# Patient Record
Sex: FEMALE
[De-identification: names, ages, dates, MRNs, and addresses within clinical notes are randomized per-mention and may not be internally consistent; named-entity substitution may affect disease eponyms.]

---

## 2017-11-12 ENCOUNTER — HOSPITAL ENCOUNTER (INPATIENT)
Dept: HOSPITAL 92 - ERS | Age: 27
LOS: 1 days | Discharge: HOME | DRG: 639 | End: 2017-11-13
Attending: FAMILY MEDICINE | Admitting: FAMILY MEDICINE
Payer: COMMERCIAL

## 2017-11-12 VITALS — BODY MASS INDEX: 30.6 KG/M2

## 2017-11-12 DIAGNOSIS — E10.10: Primary | ICD-10-CM

## 2017-11-12 DIAGNOSIS — Z79.4: ICD-10-CM

## 2017-11-12 DIAGNOSIS — F41.9: ICD-10-CM

## 2017-11-12 DIAGNOSIS — F32.9: ICD-10-CM

## 2017-11-12 LAB
ALP SERPL-CCNC: 123 U/L (ref 40–150)
ALT SERPL W P-5'-P-CCNC: 20 U/L (ref 8–55)
ANION GAP SERPL CALC-SCNC: 15 MMOL/L (ref 10–20)
ANION GAP SERPL CALC-SCNC: 15 MMOL/L (ref 10–20)
ANION GAP SERPL CALC-SCNC: 16 MMOL/L (ref 10–20)
ANION GAP SERPL CALC-SCNC: 26 MMOL/L (ref 10–20)
AST SERPL-CCNC: 17 U/L (ref 5–34)
BASOPHILS # BLD AUTO: 0 THOU/UL (ref 0–0.2)
BASOPHILS NFR BLD AUTO: 0.1 % (ref 0–1)
BILIRUB SERPL-MCNC: 0.4 MG/DL (ref 0.2–1.2)
BUN SERPL-MCNC: 12 MG/DL (ref 7–18.7)
BUN SERPL-MCNC: 15 MG/DL (ref 7–18.7)
BUN SERPL-MCNC: 7 MG/DL (ref 7–18.7)
BUN SERPL-MCNC: 8 MG/DL (ref 7–18.7)
CALCIUM SERPL-MCNC: 7.8 MG/DL (ref 7.8–10.44)
CALCIUM SERPL-MCNC: 8.5 MG/DL (ref 7.8–10.44)
CALCIUM SERPL-MCNC: 8.7 MG/DL (ref 7.8–10.44)
CALCIUM SERPL-MCNC: 9.7 MG/DL (ref 7.8–10.44)
CHLORIDE SERPL-SCNC: 104 MMOL/L (ref 98–107)
CHLORIDE SERPL-SCNC: 107 MMOL/L (ref 98–107)
CHLORIDE SERPL-SCNC: 112 MMOL/L (ref 98–107)
CHLORIDE SERPL-SCNC: 114 MMOL/L (ref 98–107)
CO2 SERPL-SCNC: 11 MMOL/L (ref 22–29)
CO2 SERPL-SCNC: 16 MMOL/L (ref 22–29)
CO2 SERPL-SCNC: 18 MMOL/L (ref 22–29)
CO2 SERPL-SCNC: 19 MMOL/L (ref 22–29)
CREAT CL PREDICTED SERPL C-G-VRATE: 0 ML/MIN (ref 70–130)
CREAT CL PREDICTED SERPL C-G-VRATE: 0 ML/MIN (ref 70–130)
CREAT CL PREDICTED SERPL C-G-VRATE: 124 ML/MIN (ref 70–130)
CREAT CL PREDICTED SERPL C-G-VRATE: 150 ML/MIN (ref 70–130)
EOSINOPHIL # BLD AUTO: 0.1 THOU/UL (ref 0–0.7)
EOSINOPHIL NFR BLD AUTO: 0.6 % (ref 0–10)
GLOBULIN SER CALC-MCNC: 3.5 G/DL (ref 2.4–3.5)
GLUCOSE UR STRIP-MCNC: 500 MG/DL
HCT VFR BLD CALC: 44.2 % (ref 36–47)
LYMPHOCYTES # BLD: 1.5 THOU/UL (ref 1.2–3.4)
LYMPHOCYTES NFR BLD AUTO: 13.8 % (ref 21–51)
MONOCYTES # BLD AUTO: 0.4 THOU/UL (ref 0.11–0.59)
MONOCYTES NFR BLD AUTO: 3.7 % (ref 0–10)
NEUTROPHILS # BLD AUTO: 8.8 THOU/UL (ref 1.4–6.5)
RBC # BLD AUTO: 4.46 MILL/UL (ref 4.2–5.4)
WBC # BLD AUTO: 10.7 THOU/UL (ref 4.8–10.8)

## 2017-11-12 PROCEDURE — 93005 ELECTROCARDIOGRAM TRACING: CPT

## 2017-11-12 PROCEDURE — 81003 URINALYSIS AUTO W/O SCOPE: CPT

## 2017-11-12 PROCEDURE — 80053 COMPREHEN METABOLIC PANEL: CPT

## 2017-11-12 PROCEDURE — 80048 BASIC METABOLIC PNL TOTAL CA: CPT

## 2017-11-12 PROCEDURE — 82010 KETONE BODYS QUAN: CPT

## 2017-11-12 PROCEDURE — 96361 HYDRATE IV INFUSION ADD-ON: CPT

## 2017-11-12 PROCEDURE — 96375 TX/PRO/DX INJ NEW DRUG ADDON: CPT

## 2017-11-12 PROCEDURE — 85025 COMPLETE CBC W/AUTO DIFF WBC: CPT

## 2017-11-12 PROCEDURE — 96374 THER/PROPH/DIAG INJ IV PUSH: CPT

## 2017-11-12 PROCEDURE — 36416 COLLJ CAPILLARY BLOOD SPEC: CPT

## 2017-11-12 PROCEDURE — 36415 COLL VENOUS BLD VENIPUNCTURE: CPT

## 2017-11-12 RX ADMIN — POTASSIUM CHLORIDE AND SODIUM CHLORIDE SCH: 900; 150 INJECTION, SOLUTION INTRAVENOUS at 15:28

## 2017-11-12 RX ADMIN — POTASSIUM CHLORIDE AND SODIUM CHLORIDE SCH MLS: 900; 150 INJECTION, SOLUTION INTRAVENOUS at 13:50

## 2017-11-12 NOTE — HP
PRIMARY CARE PHYSICIAN:  [facility].

 

CHIEF COMPLAINT:  Nausea, vomiting, and hyperglycemia.

 

PRIMARY CARE ENDOCRINOLOGIST:  Dr. Alexander at [facility].

 

HISTORY OF PRESENT ILLNESS:  This is a 27-year-old  female with past medical history of diab
etes mellitus type 1, insulin-dependent, with multiple admissions for DKA, though less frequently ov
er the last couple of years.  She reports that she has been taking her insulin as prescribed, 22 uni
ts of Lantus per day and NovoLog 8 units before each meal.  She reports that yesterday after eating 
some enchilada's, she started having abdominal pain, nausea, and vomiting.  She vomited 3-4 times ye
sterday, food and mucus, no bile or blood and this morning, she reports that her blood sugar was 500
 and she had 1 more episode of vomiting.  The patient presented to the emergency room and was found 
to have a blood sugar of 415 in the emergency room and was also tachycardic.  The patient had 2 lite
rs of normal saline bolused and then normal saline running at 500 mL per hour after that.  She also 
had 7.5 units of Novolin R IV push and Zofran.  The patient's blood sugars now down to the low 100s 
and she is feeling much better.  No further abdominal pain, nausea or vomiting in the emergency room
.

 

PAST MEDICAL HISTORY:

1.  Diabetes mellitus type 1, insulin-dependent.

2.  Depression and anxiety.

 

PAST SURGICAL HISTORY:  None.

 

ALLERGIES:  No known drug allergies.

 

MEDICATIONS:  Lantus 22 units daily, and NovoLog 8 units before each meal.

 

SOCIAL HISTORY:  Patient denies tobacco, alcohol, or illicit drug use.

 

FAMILY HISTORY:  Diabetes, heart disease and hypertension.

 

REVIEW OF SYSTEMS:  Constitutional:  No fevers, no chills, no weight changes.  Eyes:  No double visi
on or blurred vision.  ENT:  No congestion, drainage or sore throat.  Cardiovascular:  No chest pain
, palpitations or racing heart.  Pulmonary:  No coughing, wheezing or shortness of breath.  Gastroin
testinal:  No diarrhea or constipation.  See HPI for positives.  Genitourinary:  No dysuria or hemat
uria.  She has not noticed any change in her urine output recently.  Musculoskeletal:  No muscle ach
es or joint pains.  Skin:  No rashes or other lesions noted.  Neurologic:  No numbness, tingling or 
focal weakness.

 

PHYSICAL EXAMINATION:

VITAL SIGNS:  Blood pressure 102/66, pulse down to 101 after the fluids from 122 on presentation, re
spirations 15, temperature 98.5, O2 sat 98% on room air.

GENERAL:  This is a well-developed, well-nourished  female, in no apparent distress.

HEENT:  Pupils equal, round, and react to light.  Oropharynx clear without lesions, erythema or exud
ate.  She does have moist mucous membranes now.

HEART:  Regular rate and rhythm, no murmurs, rubs or gallops.

LUNGS:  Clear to auscultation bilaterally, no wheezes, crackles or rhonchi.

NECK:  Soft, nontender to palpation, normoactive bowel sounds, no hepatosplenomegaly or other masses
.

EXTREMITIES:  No clubbing, cyanosis or edema.

SKIN:  No rashes or other lesions noted.

NEUROLOGIC:  Cranial nerves intact and equal bilaterally.  No facial droop.  Deep tendon reflexes 2+
 in all extremities and strength intact in all extremities.

PSYCHIATRIC:  Patient is alert and oriented x3.  She does have a little bit of a flattened affect.

 

LABORATORY DATA:  CBC within normal limits.  Complete metabolic panel was notable only for carbon di
oxide of 11, anion gap of 26.  Her glucose was 398 on a central venous check and now down to 138 per
ipherally.  Urinalysis had glucose and ketones, but no evidence of infection and her beta hydroxybut
yrate was 6.12 in the blood.

 

ASSESSMENT AND PLAN:

1.  Diabetic ketoacidosis.  The patient's hyperglycemia has resolved with initial insulin bolus, we 
will hold on insulin drip for now.  The patient does state that she has been taking her insulin, so 
she may have her long-acting insulin still on board, thus this would be unusual for her to get as ac
idotic with some insulin.  We will check fingerstick blood sugars every hour and start insulin drip 
if she rebounds.  Otherwise, we will give her NovoLog with meals as normal and restart her Lantus wh
en she is eating.  Patient has gotten a good fluid resuscitation in the emergency room, she may need
 a little bit more, but we will recheck her basic metabolic panel and keep giving fluids until her a
nion gap acidosis resolves.

2.  We will replace potassium as this is likely to drop with only being 3.8 initially to prevent hyp
okalemia.

3.  Gastrointestinal prophylaxis.  Put the patient on Pepcid while she remains in the hospital.

4.  Deep venous thrombosis prophylaxis.  Put the patient on sequential compression devices and TEDs 
while in bed.

5.  CODE STATUS:  Patient is FULL CODE.  Should she be incapacitated, she states that her medical de
cision maker will be her mother, Shelly Gonzalez.

## 2017-11-13 VITALS — TEMPERATURE: 98.6 F | SYSTOLIC BLOOD PRESSURE: 122 MMHG | DIASTOLIC BLOOD PRESSURE: 74 MMHG

## 2017-11-13 LAB
ANION GAP SERPL CALC-SCNC: 14 MMOL/L (ref 10–20)
BASOPHILS # BLD AUTO: 0.1 THOU/UL (ref 0–0.2)
BASOPHILS NFR BLD AUTO: 0.9 % (ref 0–1)
BUN SERPL-MCNC: 7 MG/DL (ref 7–18.7)
CALCIUM SERPL-MCNC: 8.5 MG/DL (ref 7.8–10.44)
CHLORIDE SERPL-SCNC: 106 MMOL/L (ref 98–107)
CO2 SERPL-SCNC: 19 MMOL/L (ref 22–29)
CREAT CL PREDICTED SERPL C-G-VRATE: 143 ML/MIN (ref 70–130)
EOSINOPHIL # BLD AUTO: 0.2 THOU/UL (ref 0–0.7)
EOSINOPHIL NFR BLD AUTO: 1.9 % (ref 0–10)
HCT VFR BLD CALC: 38.4 % (ref 36–47)
LYMPHOCYTES # BLD: 2.7 THOU/UL (ref 1.2–3.4)
LYMPHOCYTES NFR BLD AUTO: 32.6 % (ref 21–51)
MONOCYTES # BLD AUTO: 0.6 THOU/UL (ref 0.11–0.59)
MONOCYTES NFR BLD AUTO: 7.2 % (ref 0–10)
NEUTROPHILS # BLD AUTO: 4.8 THOU/UL (ref 1.4–6.5)
RBC # BLD AUTO: 3.89 MILL/UL (ref 4.2–5.4)
WBC # BLD AUTO: 8.4 THOU/UL (ref 4.8–10.8)

## 2017-11-13 RX ADMIN — INSULIN LISPRO PRN UNIT: 100 INJECTION, SOLUTION INTRAVENOUS; SUBCUTANEOUS at 05:44

## 2017-11-13 RX ADMIN — INSULIN LISPRO PRN UNIT: 100 INJECTION, SOLUTION INTRAVENOUS; SUBCUTANEOUS at 11:55

## 2017-11-13 NOTE — PDOC.PN
- Subjective


Encounter Start Date: 11/13/17


Encounter Start Time: 09:40


Subjective: Patient without complaint this AM. No more N/V. No abd pain. BS 

normal. 


-: Eating well. Ready to go home. Has insulin at home.





- Objective


Resuscitation Status: 


 











Resuscitation Status           FULL:Full Resuscitation














MAR Reviewed: Yes


Vital Signs & Weight: 


 Vital Signs (12 hours)











  Temp Pulse Resp BP Pulse Ox


 


 11/13/17 08:00  98.8 F  78  16   98


 


 11/13/17 07:06  98.8 F  78  16  97/63  98


 


 11/13/17 04:00  98.6 F  79  18  110/69  97


 


 11/13/17 00:00  98.4 F  76  16  98/58 L  96








 Weight











Weight                         163 lb 2 oz














I&O: 


 











 11/12/17 11/13/17 11/14/17





 06:59 06:59 06:59


 


Intake Total  1320 


 


Output Total  1300 


 


Balance  20 











Result Diagrams: 


 11/13/17 03:58





 11/13/17 03:58


Additional Labs: 


 Accuchecks











  11/13/17 11/12/17 11/12/17





  05:41 20:28 16:10


 


POC Glucose  170 H  266 H  104














  11/12/17 11/12/17 11/12/17





  15:05 13:50 12:23


 


POC Glucose  108  99  94














  11/12/17





  11:12


 


POC Glucose  138 H














Phys Exam





- Physical Examination


Constitutional: NAD


HEENT: moist MMs


Respiratory: no wheezing, no rales, no rhonchi, clear to auscultation bilateral


Cardiovascular: RRR, no significant murmur


Gastrointestinal: soft, non-tender, no distention, positive bowel sounds


Neurological: non-focal, moves all 4 limbs


Psychiatric: A&O x 3


Deviation from normal: mildly flat affect





Dx/Plan


(1) Diabetic keto-acidosis


Code(s): E13.10 - OTH DIABETES MELLITUS WITH KETOACIDOSIS WITHOUT COMA   Status

: Resolved   Comment: Blood sugars normalized this AM. On home insulin. Gap 

resolved. Eating well.   





(2) Diabetes mellitus type 1


Status: Chronic   





- Plan


cont current plan of care


Home today





* .








- Discharge Day


Encounter end time: 09:55

## 2017-11-13 NOTE — DIS
PRIMARY CARE PHYSICIAN:  Caron Rose.

 

PRIMARY ENDOCRINOLOGIST:  Dr. Alexander.

 

DIAGNOSES ON ADMISSION:

1.  Diabetic ketoacidosis.

2.  Diabetes mellitus type 1, insulin-dependent.

 

DIAGNOSES ON DISCHARGE:

1.  Diabetic ketoacidosis, resolved.

2.  Diabetes mellitus type 1, insulin-dependent.

 

CONSULTATIONS:  None.

 

PROCEDURES:  None.

 

SUMMARY OF HOSPITAL COURSE:  This is a 27-year-old  female with a history of diabetes mellit
 type 1, admitted multiple times for DKA several years ago, but has not had a lot of episodes rece
ntly.  She ate some enchiladas the day before admission, started having abdominal pain, nausea, and 
vomiting.  The day of admission, she had nausea and vomiting one more time and noted to have blood s
ugars in the 500s, came into the emergency room.  Her blood sugars were running in the 400s in the e
mergency room, she was tachycardic and acidotic.  She was given multiple liters of fluid in the [facility]
gency room along with some IV push and insulin which returned her blood sugar immediately to normal.
  The patient was put in the hospital overnight.  She had IV fluids and potassium.  She was able to 
eat and was transitioned to her home insulin with normal blood sugars this morning, feeling well, no
 abdominal pain or tenderness on exam and no more nausea or vomiting.  She is being discharged home.

 

DISCHARGE MANAGEMENT:  Discharged home.  Follow up with primary care doctor in 1 week.

 

ACTIVITY:  As tolerated.

 

DIET:  Diabetic diet.

 

DISCHARGE MEDICATIONS:  Resume home medications.

1.  NovoLog FlexPen 8 units 3 times a day with meals.

2.  Lantus 22 units subcu daily.

3.  Risperidone 1 mg at night as needed.

4.  Wellbutrin- mg every morning.

5.  Ambien 5 mg at night as needed.

6.  Prozac 20 mg daily.

## 2018-02-08 ENCOUNTER — HOSPITAL ENCOUNTER (INPATIENT)
Dept: HOSPITAL 92 - ERS | Age: 28
LOS: 3 days | Discharge: HOME | DRG: 638 | End: 2018-02-11
Attending: INTERNAL MEDICINE | Admitting: INTERNAL MEDICINE
Payer: COMMERCIAL

## 2018-02-08 VITALS — BODY MASS INDEX: 26.1 KG/M2

## 2018-02-08 DIAGNOSIS — Z79.4: ICD-10-CM

## 2018-02-08 DIAGNOSIS — E10.10: Primary | ICD-10-CM

## 2018-02-08 DIAGNOSIS — F41.8: ICD-10-CM

## 2018-02-08 DIAGNOSIS — N17.9: ICD-10-CM

## 2018-02-08 DIAGNOSIS — N39.0: ICD-10-CM

## 2018-02-08 DIAGNOSIS — E87.6: ICD-10-CM

## 2018-02-08 DIAGNOSIS — E86.0: ICD-10-CM

## 2018-02-08 LAB
ALBUMIN SERPL BCG-MCNC: 4.8 G/DL (ref 3.5–5)
ALP SERPL-CCNC: 123 U/L (ref 40–150)
ALT SERPL W P-5'-P-CCNC: 16 U/L (ref 8–55)
ANION GAP SERPL CALC-SCNC: (no result) MMOL/L (ref 10–20)
ANION GAP SERPL CALC-SCNC: 24 MMOL/L (ref 10–20)
ANION GAP SERPL CALC-SCNC: 24 MMOL/L (ref 10–20)
AST SERPL-CCNC: 15 U/L (ref 5–34)
BACTERIA UR QL AUTO: (no result) HPF
BASOPHILS # BLD AUTO: 0.1 THOU/UL (ref 0–0.2)
BASOPHILS NFR BLD AUTO: 0.3 % (ref 0–1)
BILIRUB SERPL-MCNC: 0.6 MG/DL (ref 0.2–1.2)
BUN SERPL-MCNC: 10 MG/DL (ref 7–18.7)
BUN SERPL-MCNC: 11 MG/DL (ref 7–18.7)
BUN SERPL-MCNC: 11 MG/DL (ref 7–18.7)
CALCIUM SERPL-MCNC: 9.1 MG/DL (ref 7.8–10.44)
CALCIUM SERPL-MCNC: 9.2 MG/DL (ref 7.8–10.44)
CALCIUM SERPL-MCNC: 9.5 MG/DL (ref 7.8–10.44)
CHLORIDE SERPL-SCNC: 109 MMOL/L (ref 98–107)
CK MB SERPL-MCNC: 0.4 NG/ML (ref 0–6.6)
CO2 SERPL-SCNC: (no result) MMOL/L (ref 22–29)
CO2 SERPL-SCNC: 8 MMOL/L (ref 22–29)
CO2 SERPL-SCNC: 8 MMOL/L (ref 22–29)
CREAT CL PREDICTED SERPL C-G-VRATE: 0 ML/MIN (ref 70–130)
CREAT CL PREDICTED SERPL C-G-VRATE: 0 ML/MIN (ref 70–130)
CREAT CL PREDICTED SERPL C-G-VRATE: 79 ML/MIN (ref 70–130)
CRYSTAL-AUWI FLAG: 0 (ref 0–15)
EOSINOPHIL # BLD AUTO: 0.1 THOU/UL (ref 0–0.7)
EOSINOPHIL NFR BLD AUTO: 0.4 % (ref 0–10)
GLOBULIN SER CALC-MCNC: 3.7 G/DL (ref 2.4–3.5)
GLUCOSE SERPL-MCNC: 221 MG/DL (ref 70–105)
GLUCOSE SERPL-MCNC: 252 MG/DL (ref 70–105)
GLUCOSE SERPL-MCNC: 292 MG/DL (ref 70–105)
GLUCOSE UR STRIP-MCNC: >=1000 MG/DL
HEV IGM SER QL: 4 (ref 0–7.99)
HGB BLD-MCNC: 15.4 G/DL (ref 12–16)
HYALINE CASTS #/AREA URNS LPF: (no result) LPF
LYMPHOCYTES # BLD: 1.1 THOU/UL (ref 1.2–3.4)
LYMPHOCYTES NFR BLD AUTO: 6.4 % (ref 21–51)
MAGNESIUM SERPL-MCNC: 1.9 MG/DL (ref 1.6–2.6)
MCH RBC QN AUTO: 33.3 PG (ref 27–31)
MCV RBC AUTO: 97.8 FL (ref 81–99)
MONOCYTES # BLD AUTO: 1 THOU/UL (ref 0.11–0.59)
MONOCYTES NFR BLD AUTO: 5.7 % (ref 0–10)
NEUTROPHILS # BLD AUTO: 15.5 THOU/UL (ref 1.4–6.5)
NEUTROPHILS NFR BLD AUTO: 87.1 % (ref 42–75)
PATHC CAST-AUWI FLAG: 0.27 (ref 0–2.49)
PLATELET # BLD AUTO: 313 THOU/UL (ref 130–400)
POTASSIUM SERPL-SCNC: 3.2 MMOL/L (ref 3.5–5.1)
POTASSIUM SERPL-SCNC: 3.3 MMOL/L (ref 3.5–5.1)
POTASSIUM SERPL-SCNC: 3.6 MMOL/L (ref 3.5–5.1)
PREGU CONTROL BACKGROUND?: (no result)
PREGU CONTROL BAR APPEAR?: YES
PROT UR STRIP.AUTO-MCNC: 30 MG/DL
RBC # BLD AUTO: 4.61 MILL/UL (ref 4.2–5.4)
RBC UR QL AUTO: (no result) HPF (ref 0–3)
SODIUM SERPL-SCNC: 137 MMOL/L (ref 136–145)
SODIUM SERPL-SCNC: 137 MMOL/L (ref 136–145)
SODIUM SERPL-SCNC: 138 MMOL/L (ref 136–145)
SP GR UR STRIP: 1.03 (ref 1–1.04)
SPERM-AUWI FLAG: 0 (ref 0–9.9)
TROPONIN I SERPL DL<=0.01 NG/ML-MCNC: 0.01 NG/ML (ref ?–0.03)
WBC # BLD AUTO: 17.8 THOU/UL (ref 4.8–10.8)
YEAST-AUWI FLAG: 0 (ref 0–25)

## 2018-02-08 PROCEDURE — A4216 STERILE WATER/SALINE, 10 ML: HCPCS

## 2018-02-08 PROCEDURE — 96366 THER/PROPH/DIAG IV INF ADDON: CPT

## 2018-02-08 PROCEDURE — 36415 COLL VENOUS BLD VENIPUNCTURE: CPT

## 2018-02-08 PROCEDURE — 85025 COMPLETE CBC W/AUTO DIFF WBC: CPT

## 2018-02-08 PROCEDURE — 84100 ASSAY OF PHOSPHORUS: CPT

## 2018-02-08 PROCEDURE — 83036 HEMOGLOBIN GLYCOSYLATED A1C: CPT

## 2018-02-08 PROCEDURE — 96375 TX/PRO/DX INJ NEW DRUG ADDON: CPT

## 2018-02-08 PROCEDURE — 81015 MICROSCOPIC EXAM OF URINE: CPT

## 2018-02-08 PROCEDURE — 84484 ASSAY OF TROPONIN QUANT: CPT

## 2018-02-08 PROCEDURE — 80048 BASIC METABOLIC PNL TOTAL CA: CPT

## 2018-02-08 PROCEDURE — 96365 THER/PROPH/DIAG IV INF INIT: CPT

## 2018-02-08 PROCEDURE — 80053 COMPREHEN METABOLIC PANEL: CPT

## 2018-02-08 PROCEDURE — 80061 LIPID PANEL: CPT

## 2018-02-08 PROCEDURE — 82010 KETONE BODYS QUAN: CPT

## 2018-02-08 PROCEDURE — 81003 URINALYSIS AUTO W/O SCOPE: CPT

## 2018-02-08 PROCEDURE — 93005 ELECTROCARDIOGRAM TRACING: CPT

## 2018-02-08 PROCEDURE — 36416 COLLJ CAPILLARY BLOOD SPEC: CPT

## 2018-02-08 PROCEDURE — 81025 URINE PREGNANCY TEST: CPT

## 2018-02-08 PROCEDURE — 82553 CREATINE MB FRACTION: CPT

## 2018-02-08 PROCEDURE — 71046 X-RAY EXAM CHEST 2 VIEWS: CPT

## 2018-02-08 PROCEDURE — 83735 ASSAY OF MAGNESIUM: CPT

## 2018-02-08 RX ADMIN — POTASSIUM CHLORIDE, DEXTROSE MONOHYDRATE AND SODIUM CHLORIDE PRN MLS: 150; 5; 450 INJECTION, SOLUTION INTRAVENOUS at 21:27

## 2018-02-08 NOTE — HP
PRIMARY CARE PHYSICIAN:  Gregorio Bryan M.D.

 

CHIEF COMPLAINT:  Vomiting and abdominal pain.

 

HISTORY OF PRESENT ILLNESS:  Ms. Gonzalez is a pleasant 27-year-old lady who was seen at [facility] on 02/08/2018.  She has a history of diabetes mellitus type 1 and takes insulin
 at home.  She reports that she is compliant with her insulin.  She reports that she was doing well u
ntil yesterday.  Today morning, she developed nausea and vomiting.  She also reports pain in the uppe
r abdomen, dull, nonradiating, patient is unable to rate the pain, and she is unable to recall any as
sociated symptoms other than nausea and vomiting.  She denies fevers or chills.  She denies chest rula
n and shortness of breath.

 

She also denies dysuria.

 

PAST MEDICAL HISTORY:  Significant for diabetes mellitus type 1, depression and anxiety.

 

PAST SURGICAL HISTORY:  None.

 

ALLERGIES:  No known drug allergies.

 

MEDICATIONS:  The patient is unsure regarding some of her medications, but appears to be taking Lantu
s insulin 22 units daily and NovoLog insulin 8 units before each meal.

 

SOCIAL HISTORY:  The patient denies tobacco use, alcohol use or recreational drug use.

 

FAMILY HISTORY:  Significant for diabetes mellitus in her paternal grandparents.

 

PHYSICAL EXAMINATION:

GENERAL:  On examination, Ms. Gonzalez is awake and alert, not in acute distress.

VITAL SIGNS:  Temperature is 99.7 degrees Fahrenheit, blood pressure is 136/90, pulse is 161.  She is
 breathing at rate of 18 and saturating 99% on room air.

EYES:  No scleral icterus.  No conjunctival pallor.

ENT:  Dry mucosal membranes, no oropharyngeal erythema or exudates.

NECK:  Supple, nontender, normal range of movement.  Trachea is midline.

RESPIRATORY:  Accessory muscles of breathing are not active.  Chest wall movements are symmetric bila
terally.

LUNGS:  Clear to auscultation without wheeze, rhonchi or crepitations.

CARDIOVASCULAR:  S1 and S2 are heard, tachycardic and regular.  Peripheral pulses palpable.  No carot
id bruit, no pericardial rub.

ABDOMEN:  Soft, nontender, bowel sounds heard, no hepatomegaly, no splenomegaly.

NEUROLOGIC:  Cranial nerves II-XII are intact.  Deep tendon reflexes are 2+.

MUSCULOSKELETAL:  Power is 5/5 in all 4 extremities.

SKIN:  No rashes or subcutaneous nodules.

LYMPHATIC:  No cervical lymphadenopathy.

PSYCHIATRIC:  Normal mood, normal affect, patient is oriented to person, place, and time.

 

IMAGING DATA AND LABORATORY DATA:  Ms. Gonzalez's labs and investigations were reviewed.  She had an 
electrocardiogram done, which shows sinus tachycardia.  She has leukocytosis with 17,800 white cells,
 of which 87% are neutrophils, normal hemoglobin, normal platelet count, normal sodium of 137, decrea
sed potassium of 3.3, elevated chloride of 109, carbon dioxide less than 8, creatinine elevated at 1.
35, unremarkable liver profile, elevated beta hydroxybutyrate of 5.99.  Urinalysis positive for prote
in, glucose, ketones, and trace leukocyte esterase.  Urine pregnancy test is negative.

 

ASSESSMENT AND PLAN:  Ms. Gonzalez is a pleasant 27-year-old lady who was seen at [facility] on 02/08/2018.  Her problem list includes:

1.  Diabetic ketoacidosis:  She is presenting with diabetic ketoacidosis.  She will be admitted to [facility] [place] and treated with intravenous fluids, electrolyte replacement and intravenous insulin.  Jes
ology unclear at this time.  She reports that she is compliant with her insulin.  It is possible that
 this is triggered by urinary tract infection.  We will also get chest x-ray to rule out any pulmonar
y infection.  The patient has been started on ceftriaxone, which I will continue.

2.  Urinary tract infection:  Suspected, although urinalysis is positive only for small amount of fior
kocyte esterase.  We will continue her on ceftriaxone.

3.  Acute kidney injury:  Likely secondary to diabetic ketoacidosis.  Recheck creatinine.

4.  Hypokalemia:  Her electrolytes will be checked and replaced per DKA protocol.

5.  Anxiety and depression:  Her home medications can be resumed once clarified.

 

Many thanks for allowing me to participate in your patient's care.  Please feel free to contact me wi
th any questions or concerns.

 

LEVEL OF RISK:  Moderate.

 

LEVEL OF COMPLEXITY:  Moderate.

## 2018-02-08 NOTE — RAD
TWO VIEW CHEST:

2/8/18

 

HISTORY: 

Cough. Assess for lung infiltrates. 

 

COMPARISON:  

7/3/13

 

The lung fields are clear. Heart and mediastinum are unremarkable. 

 

IMPRESSION:  

No acute finding.

 

POS: [facility]

## 2018-02-09 LAB
ANION GAP SERPL CALC-SCNC: 13 MMOL/L (ref 10–20)
ANION GAP SERPL CALC-SCNC: 14 MMOL/L (ref 10–20)
BASOPHILS # BLD AUTO: 0.1 THOU/UL (ref 0–0.2)
BASOPHILS NFR BLD AUTO: 0.7 % (ref 0–1)
BUN SERPL-MCNC: 8 MG/DL (ref 7–18.7)
BUN SERPL-MCNC: 9 MG/DL (ref 7–18.7)
CALCIUM SERPL-MCNC: 8.6 MG/DL (ref 7.8–10.44)
CALCIUM SERPL-MCNC: 8.7 MG/DL (ref 7.8–10.44)
CHLORIDE SERPL-SCNC: 115 MMOL/L (ref 98–107)
CHLORIDE SERPL-SCNC: 115 MMOL/L (ref 98–107)
CO2 SERPL-SCNC: 13 MMOL/L (ref 22–29)
CO2 SERPL-SCNC: 14 MMOL/L (ref 22–29)
CREAT CL PREDICTED SERPL C-G-VRATE: 102 ML/MIN (ref 70–130)
CREAT CL PREDICTED SERPL C-G-VRATE: 90 ML/MIN (ref 70–130)
EOSINOPHIL # BLD AUTO: 0.1 THOU/UL (ref 0–0.7)
EOSINOPHIL NFR BLD AUTO: 0.9 % (ref 0–10)
GLUCOSE SERPL-MCNC: 153 MG/DL (ref 70–105)
GLUCOSE SERPL-MCNC: 155 MG/DL (ref 70–105)
HGB BLD-MCNC: 12.5 G/DL (ref 12–16)
LYMPHOCYTES # BLD: 3 THOU/UL (ref 1.2–3.4)
LYMPHOCYTES NFR BLD AUTO: 23.8 % (ref 21–51)
MCH RBC QN AUTO: 33.5 PG (ref 27–31)
MCV RBC AUTO: 97.9 FL (ref 81–99)
MONOCYTES # BLD AUTO: 1.3 THOU/UL (ref 0.11–0.59)
MONOCYTES NFR BLD AUTO: 10.5 % (ref 0–10)
NEUTROPHILS # BLD AUTO: 8.2 THOU/UL (ref 1.4–6.5)
NEUTROPHILS NFR BLD AUTO: 64.1 % (ref 42–75)
PLATELET # BLD AUTO: 243 THOU/UL (ref 130–400)
POTASSIUM SERPL-SCNC: 3.6 MMOL/L (ref 3.5–5.1)
POTASSIUM SERPL-SCNC: 4 MMOL/L (ref 3.5–5.1)
RBC # BLD AUTO: 3.72 MILL/UL (ref 4.2–5.4)
SODIUM SERPL-SCNC: 137 MMOL/L (ref 136–145)
SODIUM SERPL-SCNC: 139 MMOL/L (ref 136–145)
WBC # BLD AUTO: 12.7 THOU/UL (ref 4.8–10.8)

## 2018-02-09 RX ADMIN — POTASSIUM CHLORIDE, DEXTROSE MONOHYDRATE AND SODIUM CHLORIDE PRN MLS: 150; 5; 450 INJECTION, SOLUTION INTRAVENOUS at 05:54

## 2018-02-09 RX ADMIN — Medication SCH ML: at 22:53

## 2018-02-09 RX ADMIN — POTASSIUM CHLORIDE, DEXTROSE MONOHYDRATE AND SODIUM CHLORIDE PRN MLS: 150; 5; 450 INJECTION, SOLUTION INTRAVENOUS at 01:27

## 2018-02-09 RX ADMIN — INSULIN LISPRO SCH UNIT: 100 INJECTION, SOLUTION INTRAVENOUS; SUBCUTANEOUS at 21:24

## 2018-02-09 RX ADMIN — INSULIN LISPRO PRN UNIT: 100 INJECTION, SOLUTION INTRAVENOUS; SUBCUTANEOUS at 21:25

## 2018-02-09 RX ADMIN — POTASSIUM CHLORIDE AND SODIUM CHLORIDE SCH MLS: 450; 150 INJECTION, SOLUTION INTRAVENOUS at 23:00

## 2018-02-09 RX ADMIN — INSULIN LISPRO SCH UNIT: 100 INJECTION, SOLUTION INTRAVENOUS; SUBCUTANEOUS at 11:59

## 2018-02-09 RX ADMIN — POTASSIUM CHLORIDE AND SODIUM CHLORIDE SCH MLS: 450; 150 INJECTION, SOLUTION INTRAVENOUS at 13:27

## 2018-02-09 RX ADMIN — INSULIN LISPRO SCH UNIT: 100 INJECTION, SOLUTION INTRAVENOUS; SUBCUTANEOUS at 17:14

## 2018-02-09 RX ADMIN — CEFTRIAXONE SCH MLS: 1 INJECTION, POWDER, FOR SOLUTION INTRAMUSCULAR; INTRAVENOUS at 17:14

## 2018-02-09 RX ADMIN — Medication SCH: at 21:20

## 2018-02-09 NOTE — PDOC.PN
- Subjective


Encounter Start Date: 02/09/18


Encounter Start Time: 09:10


-: old records requested/rev





Patient seen and examined. No new complaints. No overnight events


no nausea and vomiting, doing well, no dyspnea, no abdominal pain





- Objective


MAR Reviewed: Yes


Vital Signs & Weight: 


 Vital Signs (12 hours)











  Temp Pulse Resp BP Pulse Ox


 


 02/09/18 07:57  98.8 F  99  21 H  123/72  99


 


 02/09/18 04:00  98.5 F  103 H  16  116/74  99


 


 02/09/18 00:00  98.5 F  103 H  16  116/74  99


 


 02/08/18 23:34  98.7 F  120 H  16   98











I&O: 


 











 02/08/18 02/09/18 02/10/18





 06:59 06:59 06:59


 


Intake Total  2389 252


 


Output Total  900 


 


Balance  1489 252











Result Diagrams: 


 02/09/18 03:33





 02/09/18 03:33


Additional Labs: 


 Accuchecks











  02/09/18 02/09/18 02/09/18





  08:59 08:03 07:07


 


POC Glucose  234 H  198 H  188 H














  02/09/18 02/09/18 02/09/18





  06:04 05:07 04:20


 


POC Glucose  144 H  132 H  147 H














  02/09/18 02/09/18 02/09/18





  03:06 02:09 01:23


 


POC Glucose  127 H  100  151 H














  02/09/18 02/08/18 02/08/18





  00:17 23:02 22:00


 


POC Glucose  171 H  225 H  235 H














  02/08/18





  21:05


 


POC Glucose  243 H














Phys Exam





- Physical Examination


Constitutional: NAD


HEENT: PERRLA, moist MMs, sclera anicteric


Neck: no JVD, supple


Respiratory: no wheezing, no rales, no rhonchi


Cardiovascular: RRR, no significant murmur, no rub


Gastrointestinal: soft, non-tender, no distention, positive bowel sounds


Musculoskeletal: no edema, pulses present


Neurological: non-focal, normal sensation, moves all 4 limbs


Lymphatic: no nodes


Psychiatric: normal affect, A&O x 3


Skin: no rash, normal turgor





Dx/Plan


(1) Abnormal blood electrolyte level


Code(s): E87.8 - OTH DISORDERS OF ELECTROLYTE AND FLUID BALANCE, NEC   Status: 

Resolved   





(2) Acute renal failure


Status: Resolved   





(3) DKA, type 1


Code(s): E10.10 - TYPE 1 DIABETES MELLITUS WITH KETOACIDOSIS WITHOUT COMA   

Status: Resolved   





(4) Dehydration


Code(s): E86.0 - DEHYDRATION   Status: Resolved   





(5) Anxiety and depression


Code(s): F41.8 - OTHER SPECIFIED ANXIETY DISORDERS   Status: Chronic   





(6) Diabetes mellitus type 1


Status: Chronic   





- Plan


cont current plan of care, continue antibiotics





* will start her home medication for diabetes


* will DC insulin drip


* continue slow IVF today


* start diabetic diet


* repeat labs tomorrow


* transfer to medical 


* medication reviewed as below


* symptomatic treatment.








Review of Systems





- Review of Systems


ENT: negative: Ear Pain, Ear Discharge, Nose Pain, Nose Discharge, Nose 

Congestion, Mouth Pain, Mouth Swelling, Throat Pain, Throat Swelling, Other


Respiratory: negative: Cough, Dry, Shortness of Breath, Hemoptysis, SOB with 

Excertion, Pleuritic Pain, Sputum, Wheezing


Cardiovascular: negative: chest pain, palpitations, orthopnea, paroxysmal 

nocturnal dyspnea, edema, light headedness, other


Gastrointestinal: negative: Nausea, Vomiting, Abdominal Pain, Diarrhea, 

Constipation, Melena, Hematochezia, Other


Genitourinary: negative: Dysuria, Frequency, Incontinence, Hematuria, Retention

, Other


Musculoskeletal: negative: Neck Pain, Shoulder Pain, Arm Pain, Back Pain, Hand 

Pain, Leg Pain, Foot Pain, Other


Skin: negative: Rash, Lesions, Maik, Bruising, Other





- Medications/Allergies


Allergies/Adverse Reactions: 


 Allergies











Allergy/AdvReac Type Severity Reaction Status Date / Time


 


No Known Drug Allergies Allergy   Verified 06/18/14 22:11











Medications: 


 Current Medications





Acetaminophen (Tylenol)  650 mg PO Q4H PRN


   PRN Reason: Headache/Fever or Pain


Hydrocodone Bitart/Acetaminophen (Norco 5/325)  1 tab PO Q4H PRN


   PRN Reason: Moderate Pain (4-6)


Al Hydroxide/Mg Hydroxide (Maalox)  15 ml PO Q4H PRN


   PRN Reason: Heartburn  or Indigestion


Artificial Tears (Tears Naturale)  0 drop EA EYE PRN PRN


   PRN Reason: Dry Eyes


Dextrose/Water (Dextrose 50%)  25 gm SLOW IVP PRN PRN


   PRN Reason: Hypoglycemia


Enoxaparin Sodium (Lovenox)  40 mg SC 0900 [facility]


   Last Admin: 02/09/18 09:41 Dose:  40 mg


Famotidine (Pepcid)  20 mg PO BID [facility]


Glucagon (Glucagon)  1 mg IM PRN PRN


   PRN Reason: Hypoglycemia


Guaifenesin (Robitussin Sf)  200 mg PO Q4H PRN


   PRN Reason: Cough


Hydralazine HCl (Apresoline)  10 mg SLOW IVP Q4H PRN


   PRN Reason: Systolic BP > 180


Ceftriaxone Sodium 1 gm/ (Syringe 0.4 ml/ Sterile Water)  10 mls @ 120 mls/hr 

SLOW IVP 1700 [facility]


Insulin Detemir 22 units/ (Miscellaneous Medication)  0.22 mls @ 0 mls/hr SC 

QAM [facility]


   Last Admin: 02/09/18 09:42 Dose:  0.22 mls


Potassium Chloride/Sodium Chloride (1/2 Ns W/Kcl 20 Meq)  1,000 mls @ 100 mls/

hr IV .Q10H [facility]


Dextrose/Water (D5w)  1,000 mls @ 0 mls/hr IV .Q0M PRN; As Directed


   PRN Reason: Hypoglycemia


Insulin Human Lispro (Humalog)  8 units SC TID-WM JEANA


Insulin Human Lispro (Humalog)  0 units SC .MODERATE SLIDING SC PRN


   PRN Reason: Moderate Correctional Scale


Insulin Human Lispro (Humalog)  0 units SC .BEDTIME SLIDING SC PRN


   PRN Reason: Bedtime Correctional Scale


Loperamide HCl (Imodium)  2 mg PO PRN PRN


   PRN Reason: Diarrhea/Loose Stools


Loratadine (Claritin)  10 mg PO DAILYPRN PRN


   PRN Reason: Sinus Symptoms


Magnesium Hydroxide (Milk Of Magnesium)  30 ml PO DAILYPRN PRN


   PRN Reason: Constipation


Mineral Oil/White Petrolatum (Eucerin Cream)  0 gm TOP BIDPRN PRN


   PRN Reason: Dry Skin


Ondansetron HCl (Zofran Odt)  4 mg PO Q6H PRN


   PRN Reason: Nausea/Vomiting


Phenol (Chloraseptic Spray 180 Ml Bot)  0 ml PO PRN PRN


   PRN Reason: Sore Throat


Senna (Senokot)  2 tab PO HSPRN PRN


   PRN Reason: Constipation


Sodium Chloride (Ocean Nasal Spray 0.65%)  0 ml EA NARE QIDPRN PRN


   PRN Reason: Nasal Congestion


Temazepam (Restoril)  15 mg PO HSPRN PRN


   PRN Reason: Insomnia

## 2018-02-10 LAB
ANION GAP SERPL CALC-SCNC: 12 MMOL/L (ref 10–20)
BASOPHILS # BLD AUTO: 0.1 THOU/UL (ref 0–0.2)
BASOPHILS NFR BLD AUTO: 0.8 % (ref 0–1)
BUN SERPL-MCNC: 6 MG/DL (ref 7–18.7)
CALCIUM SERPL-MCNC: 8.6 MG/DL (ref 7.8–10.44)
CHD RISK SERPL-RTO: 4.8 (ref ?–4.5)
CHLORIDE SERPL-SCNC: 107 MMOL/L (ref 98–107)
CHOLEST SERPL-MCNC: 157 MG/DL
CO2 SERPL-SCNC: 23 MMOL/L (ref 22–29)
CREAT CL PREDICTED SERPL C-G-VRATE: 153 ML/MIN (ref 70–130)
EOSINOPHIL # BLD AUTO: 0.1 THOU/UL (ref 0–0.7)
EOSINOPHIL NFR BLD AUTO: 0.8 % (ref 0–10)
GLUCOSE SERPL-MCNC: 224 MG/DL (ref 70–105)
HDLC SERPL-MCNC: 33 MG/DL
HGB BLD-MCNC: 12 G/DL (ref 12–16)
LDLC SERPL CALC-MCNC: 113 MG/DL
LYMPHOCYTES # BLD: 1.7 THOU/UL (ref 1.2–3.4)
LYMPHOCYTES NFR BLD AUTO: 22.9 % (ref 21–51)
MAGNESIUM SERPL-MCNC: 1.5 MG/DL (ref 1.6–2.6)
MCH RBC QN AUTO: 33.1 PG (ref 27–31)
MCV RBC AUTO: 98.5 FL (ref 81–99)
MONOCYTES # BLD AUTO: 0.6 THOU/UL (ref 0.11–0.59)
MONOCYTES NFR BLD AUTO: 7.2 % (ref 0–10)
NEUTROPHILS # BLD AUTO: 5.2 THOU/UL (ref 1.4–6.5)
NEUTROPHILS NFR BLD AUTO: 68.3 % (ref 42–75)
PLATELET # BLD AUTO: 220 THOU/UL (ref 130–400)
POTASSIUM SERPL-SCNC: 3.4 MMOL/L (ref 3.5–5.1)
RBC # BLD AUTO: 3.62 MILL/UL (ref 4.2–5.4)
SODIUM SERPL-SCNC: 139 MMOL/L (ref 136–145)
TRIGL SERPL-MCNC: 53 MG/DL (ref ?–150)
WBC # BLD AUTO: 7.6 THOU/UL (ref 4.8–10.8)

## 2018-02-10 RX ADMIN — INSULIN LISPRO PRN UNIT: 100 INJECTION, SOLUTION INTRAVENOUS; SUBCUTANEOUS at 21:25

## 2018-02-10 RX ADMIN — INSULIN LISPRO SCH UNIT: 100 INJECTION, SOLUTION INTRAVENOUS; SUBCUTANEOUS at 08:45

## 2018-02-10 RX ADMIN — POTASSIUM CHLORIDE AND SODIUM CHLORIDE SCH MLS: 450; 150 INJECTION, SOLUTION INTRAVENOUS at 05:54

## 2018-02-10 RX ADMIN — POTASSIUM CHLORIDE AND SODIUM CHLORIDE SCH MLS: 450; 150 INJECTION, SOLUTION INTRAVENOUS at 21:23

## 2018-02-10 RX ADMIN — CEFTRIAXONE SCH MLS: 1 INJECTION, POWDER, FOR SOLUTION INTRAMUSCULAR; INTRAVENOUS at 17:32

## 2018-02-10 RX ADMIN — POTASSIUM CHLORIDE AND SODIUM CHLORIDE SCH: 450; 150 INJECTION, SOLUTION INTRAVENOUS at 17:32

## 2018-02-10 RX ADMIN — INSULIN LISPRO SCH UNIT: 100 INJECTION, SOLUTION INTRAVENOUS; SUBCUTANEOUS at 17:34

## 2018-02-10 RX ADMIN — INSULIN LISPRO PRN UNIT: 100 INJECTION, SOLUTION INTRAVENOUS; SUBCUTANEOUS at 05:54

## 2018-02-10 RX ADMIN — Medication SCH ML: at 08:44

## 2018-02-10 RX ADMIN — INSULIN LISPRO SCH UNIT: 100 INJECTION, SOLUTION INTRAVENOUS; SUBCUTANEOUS at 13:23

## 2018-02-10 NOTE — PDOC.PN
- Subjective


Encounter Start Date: 02/10/18


Encounter Start Time: 13:30





Patient seen and examined. No overnight events. Feels gen weak with low grade 

fever





- Objective


MAR Reviewed: Yes


Vital Signs & Weight: 


 Vital Signs (12 hours)











  Temp Pulse Resp BP Pulse Ox


 


 02/10/18 16:55  99.0 F  120 H  18  125/82  98


 


 02/10/18 11:33  99.9 F H  105 H  16  127/86  96








 Weight











Weight                         148 lb














I&O: 


 











 02/09/18 02/10/18 02/11/18





 06:59 06:59 06:59


 


Intake Total 2389 252 


 


Output Total 900  


 


Balance 1489 252 











Result Diagrams: 


 02/11/18 05:29





 02/11/18 05:29


Additional Labs: 


 Accuchecks











  02/10/18 02/10/18 02/10/18





  16:21 11:30 05:16


 


POC Glucose  222 H  103  227 H














  02/10/18





  00:49


 


POC Glucose  115 H














Phys Exam





- Physical Examination


Constitutional: NAD


Respiratory: no wheezing, no rhonchi


Cardiovascular: RRR, no rub


Gastrointestinal: soft, non-tender, positive bowel sounds


Musculoskeletal: no edema





Dx/Plan





- Plan


DVT proph w/SCDs





IMPRESSION:


1. DKA - improving


2. UTI - on Atbx, No cultures sent


3. DM type 1


4. DWIGHT


5. Anxiety/Depression / Hypokalemia





PLAN:


* Still has low grade fever


* Will cont Atbx


* Ketones still elevated


* Cont IVF


* Cont Levemir with sliding scale


* Possible dc in AM.


 Laboratory Tests











  02/10/18





  05:20


 


B-Hydroxybutyrate  2.57 H

















Review of Systems





- Review of Systems


Respiratory: negative: Cough, Dry, Shortness of Breath, Hemoptysis, SOB with 

Excertion, Pleuritic Pain, Sputum, Wheezing


Cardiovascular: negative: chest pain, palpitations, orthopnea, paroxysmal 

nocturnal dyspnea, edema, light headedness


Gastrointestinal: negative: Nausea, Vomiting, Abdominal Pain, Diarrhea, 

Constipation, Melena, Hematochezia





- Medications/Allergies


Allergies/Adverse Reactions: 


 Allergies











Allergy/AdvReac Type Severity Reaction Status Date / Time


 


No Known Drug Allergies Allergy   Verified 06/18/14 22:11











Medications: 


 Current Medications





Acetaminophen (Tylenol)  650 mg PO Q4H PRN


   PRN Reason: Headache/Fever or Pain


Hydrocodone Bitart/Acetaminophen (Norco 5/325)  1 tab PO Q4H PRN


   PRN Reason: Moderate Pain (4-6)


Al Hydroxide/Mg Hydroxide (Maalox)  15 ml PO Q4H PRN


   PRN Reason: Heartburn  or Indigestion


Artificial Tears (Tears Naturale)  0 drop EA EYE PRN PRN


   PRN Reason: Dry Eyes


Dextrose/Water (Dextrose 50%)  25 gm SLOW IVP PRN PRN


   PRN Reason: Hypoglycemia


Enoxaparin Sodium (Lovenox)  40 mg SC 0900 [facility]


   Last Admin: 02/10/18 08:44 Dose:  40 mg


Famotidine (Pepcid)  20 mg PO BID [facility]


   Last Admin: 02/10/18 21:11 Dose:  20 mg


Glucagon (Glucagon)  1 mg IM PRN PRN


   PRN Reason: Hypoglycemia


Guaifenesin (Robitussin Sf)  200 mg PO Q4H PRN


   PRN Reason: Cough


Hydralazine HCl (Apresoline)  10 mg SLOW IVP Q4H PRN


   PRN Reason: Systolic BP > 180


Ceftriaxone Sodium 1 gm/ (Syringe 0.4 ml/ Sterile Water)  10 mls @ 120 mls/hr 

SLOW IVP 1700 [facility]


   Last Admin: 02/10/18 17:32 Dose:  10 mls


Insulin Detemir 22 units/ (Miscellaneous Medication)  0.22 mls @ 0 mls/hr SC 

QAM [facility]


   Last Admin: 02/10/18 08:44 Dose:  0.22 mls


Potassium Chloride/Sodium Chloride (1/2 Ns W/Kcl 20 Meq)  1,000 mls @ 100 mls/

hr IV .Q10H [facility]


   Last Admin: 02/10/18 21:23 Dose:  1,000 mls


Dextrose/Water (D5w)  1,000 mls @ 0 mls/hr IV .Q0M PRN; As Directed


   PRN Reason: Hypoglycemia


Insulin Human Lispro (Humalog)  8 units SC TID-WM [facility]


   Last Admin: 02/10/18 17:34 Dose:  8 unit


Insulin Human Lispro (Humalog)  0 units SC .MODERATE SLIDING SC PRN


   PRN Reason: Moderate Correctional Scale


   Last Admin: 02/10/18 05:54 Dose:  4 unit


Insulin Human Lispro (Humalog)  0 units SC .BEDTIME SLIDING SC PRN


   PRN Reason: Bedtime Correctional Scale


   Last Admin: 02/10/18 21:25 Dose:  3 unit


Loperamide HCl (Imodium)  2 mg PO PRN PRN


   PRN Reason: Diarrhea/Loose Stools


Loratadine (Claritin)  10 mg PO DAILYPRN PRN


   PRN Reason: Sinus Symptoms


Magnesium Hydroxide (Milk Of Magnesium)  30 ml PO DAILYPRN PRN


   PRN Reason: Constipation


Mineral Oil/White Petrolatum (Eucerin Cream)  0 gm TOP BIDPRN PRN


   PRN Reason: Dry Skin


Ondansetron HCl (Zofran Odt)  4 mg PO Q6H PRN


   PRN Reason: Nausea/Vomiting


Phenol (Chloraseptic Spray 180 Ml Bot)  0 ml PO PRN PRN


   PRN Reason: Sore Throat


Senna (Senokot)  2 tab PO HSPRN PRN


   PRN Reason: Constipation


Sodium Chloride (Ocean Nasal Spray 0.65%)  0 ml EA NARE QIDPRN PRN


   PRN Reason: Nasal Congestion


Sodium Chloride (Flush - Normal Saline)  10 ml IVF Q12HR [facility]


   Last Admin: 02/10/18 08:44 Dose:  10 ml


Sodium Chloride (Flush - Normal Saline)  10 ml IVF PRN PRN


   PRN Reason: Saline Flush


Temazepam (Restoril)  15 mg PO HSPRN PRN


   PRN Reason: Insomnia

## 2018-02-11 VITALS — TEMPERATURE: 98.9 F

## 2018-02-11 VITALS — SYSTOLIC BLOOD PRESSURE: 135 MMHG | DIASTOLIC BLOOD PRESSURE: 86 MMHG

## 2018-02-11 LAB
ANION GAP SERPL CALC-SCNC: 9 MMOL/L (ref 10–20)
BASOPHILS # BLD AUTO: 0.1 THOU/UL (ref 0–0.2)
BASOPHILS NFR BLD AUTO: 1.1 % (ref 0–1)
BUN SERPL-MCNC: 5 MG/DL (ref 7–18.7)
CALCIUM SERPL-MCNC: 8.7 MG/DL (ref 7.8–10.44)
CHLORIDE SERPL-SCNC: 106 MMOL/L (ref 98–107)
CO2 SERPL-SCNC: 29 MMOL/L (ref 22–29)
CREAT CL PREDICTED SERPL C-G-VRATE: 146 ML/MIN (ref 70–130)
EOSINOPHIL # BLD AUTO: 0.1 THOU/UL (ref 0–0.7)
EOSINOPHIL NFR BLD AUTO: 1.2 % (ref 0–10)
GLUCOSE SERPL-MCNC: 270 MG/DL (ref 70–105)
HGB BLD-MCNC: 11.3 G/DL (ref 12–16)
LYMPHOCYTES # BLD: 2.3 THOU/UL (ref 1.2–3.4)
LYMPHOCYTES NFR BLD AUTO: 36.2 % (ref 21–51)
MAGNESIUM SERPL-MCNC: 1.8 MG/DL (ref 1.6–2.6)
MCH RBC QN AUTO: 33.5 PG (ref 27–31)
MCV RBC AUTO: 98.1 FL (ref 81–99)
MONOCYTES # BLD AUTO: 0.7 THOU/UL (ref 0.11–0.59)
MONOCYTES NFR BLD AUTO: 11 % (ref 0–10)
NEUTROPHILS # BLD AUTO: 3.2 THOU/UL (ref 1.4–6.5)
NEUTROPHILS NFR BLD AUTO: 50.5 % (ref 42–75)
PLATELET # BLD AUTO: 199 THOU/UL (ref 130–400)
POTASSIUM SERPL-SCNC: 3.5 MMOL/L (ref 3.5–5.1)
RBC # BLD AUTO: 3.36 MILL/UL (ref 4.2–5.4)
SODIUM SERPL-SCNC: 140 MMOL/L (ref 136–145)
WBC # BLD AUTO: 6.3 THOU/UL (ref 4.8–10.8)

## 2018-02-11 RX ADMIN — Medication SCH ML: at 09:29

## 2018-02-11 RX ADMIN — INSULIN LISPRO PRN UNIT: 100 INJECTION, SOLUTION INTRAVENOUS; SUBCUTANEOUS at 06:22

## 2018-02-11 RX ADMIN — POTASSIUM CHLORIDE AND SODIUM CHLORIDE SCH: 450; 150 INJECTION, SOLUTION INTRAVENOUS at 16:23

## 2018-02-11 RX ADMIN — CEFTRIAXONE SCH MLS: 1 INJECTION, POWDER, FOR SOLUTION INTRAMUSCULAR; INTRAVENOUS at 16:24

## 2018-02-11 RX ADMIN — INSULIN LISPRO SCH UNIT: 100 INJECTION, SOLUTION INTRAVENOUS; SUBCUTANEOUS at 09:30

## 2018-02-11 RX ADMIN — INSULIN LISPRO SCH UNIT: 100 INJECTION, SOLUTION INTRAVENOUS; SUBCUTANEOUS at 12:29

## 2018-02-11 NOTE — DIS
DATE OF DISCHARGE:  02/11/2018

 

DISCHARGE DISPOSITION:  Home.

 

FOLLOWUP:  Follow up with primary care physician at [facility] in 1 week.

 

ALLERGIES:  No known drug allergies.

 

DISCHARGE MEDICATIONS:

1.  Bactrim 1 tablet b.i.d. for the next 5 days.

2.  Other home medications were resumed.

 

The patient was seen on the day of discharge, denies any new complaints.  No chest pain, shortness of
 breath, palpitations.

 

BRIEF HOSPITAL COURSE:  The patient is a 27-year-old  female with diabetes mellitus type 1, p
resented to the hospital with abdominal pain and vomiting.  Her workup was consistent with diabetic k
etoacidosis along with urinary tract infection.  She was placed on diabetic ketoacidosis protocol wit
h insulin drip and IV fluids along with empiric antibiotics.  The antibiotics has been changed to Maryam
trim.  She appears stable for discharge.  Ketone on admission was 5.99 and at discharge is 1.03.  WBC
 on admission was 17.8 and at discharge is 6.3.  She was extensively counseled on prevention of UTIs 
as well as diabetes mellitus type 1.

 

FINAL DIAGNOSES:

1.  Diabetic ketoacidosis.

2.  Diabetes mellitus type 1.

3.  Urinary tract infection.

4.  Acute kidney injury with a maximum creatinine of 1.35 on admission.

5.  Anxiety and depression.

6.  Hypokalemia, corrected.

 

Plan of care was discussed with the patient in detail.  She stated understanding.

## 2018-10-27 ENCOUNTER — HOSPITAL ENCOUNTER (EMERGENCY)
Dept: HOSPITAL 92 - ERS | Age: 28
Discharge: HOME | End: 2018-10-27
Payer: COMMERCIAL

## 2018-10-27 DIAGNOSIS — R51: ICD-10-CM

## 2018-10-27 DIAGNOSIS — F32.9: ICD-10-CM

## 2018-10-27 DIAGNOSIS — E10.65: Primary | ICD-10-CM

## 2018-10-27 LAB
ALBUMIN SERPL BCG-MCNC: 4.6 G/DL (ref 3.5–5)
ALP SERPL-CCNC: 82 U/L (ref 40–150)
ALT SERPL W P-5'-P-CCNC: 12 U/L (ref 8–55)
ANION GAP SERPL CALC-SCNC: 18 MMOL/L (ref 10–20)
AST SERPL-CCNC: 15 U/L (ref 5–34)
BASOPHILS # BLD AUTO: 0 THOU/UL (ref 0–0.2)
BASOPHILS NFR BLD AUTO: 0.4 % (ref 0–1)
BICARBONATE (HCO3V): 22.5 MMOL/L (ref 1–85)
BILIRUB SERPL-MCNC: 0.9 MG/DL (ref 0.2–1.2)
BUN SERPL-MCNC: 9 MG/DL (ref 7–18.7)
CA-I BLD-SCNC: 1.13 MMOL/L (ref 1.12–1.32)
CALCIUM SERPL-MCNC: 9.7 MG/DL (ref 7.8–10.44)
CHLORIDE SERPL-SCNC: 104 MMOL/L (ref 98–107)
CHLORIDE SERPL-SCNC: 110 MMOL/L (ref 98–113)
CO2 BLDV CALC-SCNC: 23.6 MMOL/L (ref 1–85)
CO2 SERPL-SCNC: 19 MMOL/L (ref 22–29)
CO2 TENSION (PVCO2): 35.5 MMHG (ref 41–51)
CREAT CL PREDICTED SERPL C-G-VRATE: 0 ML/MIN (ref 70–130)
EOSINOPHIL # BLD AUTO: 0.1 THOU/UL (ref 0–0.7)
EOSINOPHIL NFR BLD AUTO: 0.5 % (ref 0–10)
GLOBULIN SER CALC-MCNC: 3 G/DL (ref 2.4–3.5)
GLUCOSE SERPL-MCNC: 338 MG/DL (ref 70–105)
HCT VFR BLD CALC: 40 % (ref 36–52)
HEMOGLOBIN - CALC: 13.7 G/DL (ref 12–18)
HGB BLD-MCNC: 14.6 G/DL (ref 12–16)
LYMPHOCYTES # BLD: 1.8 THOU/UL (ref 1.2–3.4)
LYMPHOCYTES NFR BLD AUTO: 17.8 % (ref 21–51)
MCH RBC QN AUTO: 31.9 PG (ref 27–31)
MCV RBC AUTO: 98 FL (ref 78–98)
MONOCYTES # BLD AUTO: 0.5 THOU/UL (ref 0.11–0.59)
MONOCYTES NFR BLD AUTO: 5 % (ref 0–10)
NEUTROPHILS # BLD AUTO: 7.8 THOU/UL (ref 1.4–6.5)
NEUTROPHILS NFR BLD AUTO: 76.3 % (ref 42–75)
O2 TENSION (PVO2): 170.8 MMHG (ref 35–45)
PLATELET # BLD AUTO: 304 THOU/UL (ref 130–400)
POTASSIUM SERPL-SCNC: 3.8 MMOL/L (ref 3.4–4.7)
POTASSIUM SERPL-SCNC: 4.2 MMOL/L (ref 3.5–5.1)
RBC # BLD AUTO: 4.57 MILL/UL (ref 4.2–5.4)
SAO2 % BLDV FROM PO2: 99.6 % (ref 94–98)
SODIUM SERPL-SCNC: 137 MMOL/L (ref 136–145)
SODIUM SERPL-SCNC: 142 MMOL/L (ref 138–145)
WBC # BLD AUTO: 10.2 THOU/UL (ref 4.8–10.8)

## 2018-10-27 PROCEDURE — 82330 ASSAY OF CALCIUM: CPT

## 2018-10-27 PROCEDURE — 82803 BLOOD GASES ANY COMBINATION: CPT

## 2018-10-27 PROCEDURE — 85025 COMPLETE CBC W/AUTO DIFF WBC: CPT

## 2018-10-27 PROCEDURE — 80053 COMPREHEN METABOLIC PANEL: CPT

## 2018-10-27 PROCEDURE — 96375 TX/PRO/DX INJ NEW DRUG ADDON: CPT

## 2018-10-27 PROCEDURE — 96361 HYDRATE IV INFUSION ADD-ON: CPT

## 2018-10-27 PROCEDURE — 96374 THER/PROPH/DIAG INJ IV PUSH: CPT

## 2018-10-27 PROCEDURE — 36416 COLLJ CAPILLARY BLOOD SPEC: CPT

## 2018-10-27 PROCEDURE — 36415 COLL VENOUS BLD VENIPUNCTURE: CPT

## 2018-10-27 PROCEDURE — 82010 KETONE BODYS QUAN: CPT

## 2018-12-29 ENCOUNTER — HOSPITAL ENCOUNTER (INPATIENT)
Dept: HOSPITAL 92 - ERS | Age: 28
LOS: 2 days | Discharge: HOME | DRG: 638 | End: 2018-12-31
Attending: INTERNAL MEDICINE | Admitting: INTERNAL MEDICINE
Payer: COMMERCIAL

## 2018-12-29 VITALS — BODY MASS INDEX: 29.2 KG/M2

## 2018-12-29 DIAGNOSIS — E10.10: Primary | ICD-10-CM

## 2018-12-29 DIAGNOSIS — N39.0: ICD-10-CM

## 2018-12-29 DIAGNOSIS — F32.9: ICD-10-CM

## 2018-12-29 DIAGNOSIS — F79: ICD-10-CM

## 2018-12-29 DIAGNOSIS — F41.9: ICD-10-CM

## 2018-12-29 DIAGNOSIS — Z79.4: ICD-10-CM

## 2018-12-29 LAB
ALBUMIN SERPL BCG-MCNC: 4.2 G/DL (ref 3.5–5)
ALP SERPL-CCNC: 87 U/L (ref 40–150)
ALT SERPL W P-5'-P-CCNC: 13 U/L (ref 8–55)
ANION GAP SERPL CALC-SCNC: 11 MMOL/L (ref 10–20)
ANION GAP SERPL CALC-SCNC: 14 MMOL/L (ref 10–20)
ANION GAP SERPL CALC-SCNC: 18 MMOL/L (ref 10–20)
AST SERPL-CCNC: 12 U/L (ref 5–34)
BACTERIA UR QL AUTO: (no result) HPF
BASOPHILS # BLD AUTO: 0.1 THOU/UL (ref 0–0.2)
BASOPHILS NFR BLD AUTO: 0.6 % (ref 0–1)
BICARBONATE (HCO3V): 12.1 MMOL/L (ref 22–29)
BILIRUB SERPL-MCNC: 0.5 MG/DL (ref 0.2–1.2)
BUN SERPL-MCNC: 12 MG/DL (ref 7–18.7)
BUN SERPL-MCNC: 7 MG/DL (ref 7–18.7)
BUN SERPL-MCNC: 8 MG/DL (ref 7–18.7)
CA-I BLD-SCNC: 1.24 MMOL/L (ref 1.12–1.32)
CALCIUM SERPL-MCNC: 7.9 MG/DL (ref 7.8–10.44)
CALCIUM SERPL-MCNC: 8 MG/DL (ref 7.8–10.44)
CALCIUM SERPL-MCNC: 9 MG/DL (ref 7.8–10.44)
CHLORIDE SERPL-SCNC: 108 MMOL/L (ref 98–107)
CHLORIDE SERPL-SCNC: 112 MMOL/L (ref 98–107)
CHLORIDE SERPL-SCNC: 113 MMOL/L (ref 98–113)
CHLORIDE SERPL-SCNC: 114 MMOL/L (ref 98–107)
CO2 BLDV CALC-SCNC: 12.8 MMOL/L (ref 1–85)
CO2 SERPL-SCNC: 12 MMOL/L (ref 22–29)
CO2 SERPL-SCNC: 15 MMOL/L (ref 22–29)
CO2 SERPL-SCNC: 16 MMOL/L (ref 22–29)
CO2 TENSION (PVCO2): 25.2 MMHG (ref 41–51)
CREAT CL PREDICTED SERPL C-G-VRATE: 0 ML/MIN (ref 70–130)
CRYSTAL-AUWI FLAG: 5.3 (ref 0–15)
EOSINOPHIL # BLD AUTO: 0.1 THOU/UL (ref 0–0.7)
EOSINOPHIL NFR BLD AUTO: 1.1 % (ref 0–10)
GLOBULIN SER CALC-MCNC: 3.2 G/DL (ref 2.4–3.5)
GLUCOSE SERPL-MCNC: 152 MG/DL (ref 70–105)
GLUCOSE SERPL-MCNC: 173 MG/DL (ref 70–105)
GLUCOSE SERPL-MCNC: 266 MG/DL (ref 70–105)
GLUCOSE UR STRIP-MCNC: >=1000 MG/DL
HCT VFR BLD CALC: 46 % (ref 36–52)
HEMOGLOBIN - CALC: 15.8 G/DL (ref 12–18)
HEV IGM SER QL: 2.1 (ref 0–7.99)
HGB BLD-MCNC: 13.9 G/DL (ref 12–16)
HYALINE CASTS #/AREA URNS LPF: (no result) LPF
LIPASE SERPL-CCNC: 5 U/L (ref 8–78)
LYMPHOCYTES # BLD: 1.6 THOU/UL (ref 1.2–3.4)
LYMPHOCYTES NFR BLD AUTO: 18.1 % (ref 21–51)
MCH RBC QN AUTO: 31.9 PG (ref 27–31)
MCV RBC AUTO: 95.4 FL (ref 78–98)
MONOCYTES # BLD AUTO: 0.6 THOU/UL (ref 0.11–0.59)
MONOCYTES NFR BLD AUTO: 7.3 % (ref 0–10)
NEUTROPHILS # BLD AUTO: 6.3 THOU/UL (ref 1.4–6.5)
NEUTROPHILS NFR BLD AUTO: 73 % (ref 42–75)
O2 TENSION (PVO2): 52.2 MMHG (ref 35–45)
PATHC CAST-AUWI FLAG: 0.87 (ref 0–2.49)
PLATELET # BLD AUTO: 263 THOU/UL (ref 130–400)
POTASSIUM SERPL-SCNC: 3.5 MMOL/L (ref 3.5–5.1)
POTASSIUM SERPL-SCNC: 3.6 MMOL/L (ref 3.5–5.1)
POTASSIUM SERPL-SCNC: 4 MMOL/L (ref 3.4–4.7)
POTASSIUM SERPL-SCNC: 4.1 MMOL/L (ref 3.5–5.1)
PREGS CONTROL BACKGROUND?: (no result)
PREGS CONTROL BAR APPEAR?: YES
PROT UR STRIP.AUTO-MCNC: 30 MG/DL
RBC # BLD AUTO: 4.36 MILL/UL (ref 4.2–5.4)
RBC UR QL AUTO: (no result) HPF (ref 0–3)
SAO2 % BLDV FROM PO2: 83.3 % (ref 94–98)
SODIUM SERPL-SCNC: 134 MMOL/L (ref 136–145)
SODIUM SERPL-SCNC: 135 MMOL/L (ref 136–145)
SODIUM SERPL-SCNC: 137 MMOL/L (ref 138–145)
SODIUM SERPL-SCNC: 139 MMOL/L (ref 136–145)
SP GR UR STRIP: 1.03 (ref 1–1.04)
SPERM-AUWI FLAG: 0 (ref 0–9.9)
WBC # BLD AUTO: 8.6 THOU/UL (ref 4.8–10.8)
WBC UR QL AUTO: (no result) HPF (ref 0–3)
YEAST-AUWI FLAG: 0 (ref 0–25)

## 2018-12-29 PROCEDURE — 83735 ASSAY OF MAGNESIUM: CPT

## 2018-12-29 PROCEDURE — 87040 BLOOD CULTURE FOR BACTERIA: CPT

## 2018-12-29 PROCEDURE — 36416 COLLJ CAPILLARY BLOOD SPEC: CPT

## 2018-12-29 PROCEDURE — 83690 ASSAY OF LIPASE: CPT

## 2018-12-29 PROCEDURE — 81003 URINALYSIS AUTO W/O SCOPE: CPT

## 2018-12-29 PROCEDURE — 82330 ASSAY OF CALCIUM: CPT

## 2018-12-29 PROCEDURE — 84100 ASSAY OF PHOSPHORUS: CPT

## 2018-12-29 PROCEDURE — 80048 BASIC METABOLIC PNL TOTAL CA: CPT

## 2018-12-29 PROCEDURE — 87086 URINE CULTURE/COLONY COUNT: CPT

## 2018-12-29 PROCEDURE — 96367 TX/PROPH/DG ADDL SEQ IV INF: CPT

## 2018-12-29 PROCEDURE — 96365 THER/PROPH/DIAG IV INF INIT: CPT

## 2018-12-29 PROCEDURE — 85025 COMPLETE CBC W/AUTO DIFF WBC: CPT

## 2018-12-29 PROCEDURE — 96361 HYDRATE IV INFUSION ADD-ON: CPT

## 2018-12-29 PROCEDURE — 82010 KETONE BODYS QUAN: CPT

## 2018-12-29 PROCEDURE — 36415 COLL VENOUS BLD VENIPUNCTURE: CPT

## 2018-12-29 PROCEDURE — 82803 BLOOD GASES ANY COMBINATION: CPT

## 2018-12-29 PROCEDURE — 80053 COMPREHEN METABOLIC PANEL: CPT

## 2018-12-29 PROCEDURE — 84703 CHORIONIC GONADOTROPIN ASSAY: CPT

## 2018-12-29 PROCEDURE — 81015 MICROSCOPIC EXAM OF URINE: CPT

## 2018-12-29 RX ADMIN — INSULIN LISPRO SCH: 100 INJECTION, SOLUTION INTRAVENOUS; SUBCUTANEOUS at 18:36

## 2018-12-29 RX ADMIN — POTASSIUM CHLORIDE, DEXTROSE MONOHYDRATE AND SODIUM CHLORIDE SCH MLS: 150; 5; 450 INJECTION, SOLUTION INTRAVENOUS at 16:27

## 2018-12-29 NOTE — CON
DATE OF CONSULTATION:  12/29/2018



SERVICE:  Pulmonary Medicine.



REASON FOR CONSULT:  [facility] patient.



HISTORY OF PRESENT ILLNESS:  The patient is a very pleasant 28-year-old 

female with past medical history significant for type 1 diabetes mellitus.  She is a

very poor historian.  She has absolutely no idea how much long-acting insulin she

takes.  She does not know how much NovoLog she takes.  All she can tell me is that

she takes a long-acting once a day, and she gets herself pre-meal insulin 3 times a

day before meals.  She was in her usual state of health until the best I can

understand 2 or 3 days ago, at which point, she started having increasing symptoms

of feeling sick.  She cannot elaborate on that currently.  That being said, by chart

review apparently, she was having some nausea and vomiting and took her blood sugar,

which was elevated.  She ended up taking 26 units of NovoLog yesterday evening, but

woke up feeling worse and so she was brought to the Emergency Department.  She was

placed on insulin drip.  The patient is actually indicating that she is breathing

fairly well.  She does not currently have any nausea.  She has no abdominal

discomfort.  Otherwise, she is in her usual state of health. 



PAST MEDICAL HISTORY:  

1. Diabetes mellitus, type 1.

2. Major depressive disorder.

3. Anxiety disorder.



PAST SURGICAL HISTORY:  None.



FAMILY HISTORY:  Noncontributory.



SOCIAL HISTORY:  Negative for alcohol, tobacco, or illicit drug use.  She has no

exposure to chemicals, dust, asbestos, or tuberculosis. 



ALLERGIES:  NO KNOWN DRUG ALLERGIES.



MEDICATIONS:  List of her inpatient medications was reviewed.  I am thankful that we

have an accurate medication list on her.  On average, she will take 46 units on a

daily basis in various long-acting and short-acting forms. 



REVIEW OF SYSTEMS:  General, head, ears, eyes, nose, throat, cardiovascular,

respiratory, GI, , musculoskeletal, neurologic, and skin are negative except as

mentioned is the HPI. 



PHYSICAL EXAMINATION:

VITAL SIGNS:  Afebrile, pulse 95, blood pressure 114/79, respirations 16, and

saturation 100% on room air. 

GENERAL:  The patient is awake and alert, in no apparent distress. 

LUNGS:  Decent air entry.  There is no prolonged expiratory phase or wheezing

present. 

HEART:  Normal rate, regular. 

ABDOMEN:  Soft, nontender, and nondistended.  Bowel sounds are positive. 

MUSCULOSKELETAL:  No cyanosis or clubbing.  No pitting in the bilateral lower

extremities. 

NEUROLOGIC:  Grossly nonfocal.



LABORATORY DATA:  CBC is completely unremarkable.  A pH of 7.28, pCO2 of 25, and pO2

of 52 on a VBG.  Bicarb is slowly improving to 16.  Chloride is downtrending to 112

and sodium 135.  Potassium 3.6.  Anion gap has now resolved.  Creatinine 0.76.

Basic metabolic profile is otherwise unremarkable.  Liver function studies were

previously unremarkable.  Serum pregnancy and lipase were unremarkable.  Urinalysis

is significant for glycosuria, and ketonuria and trace proteinuria, but otherwise

negative for significant findings of infection.  Beta-hydroxybutyric acid 5.26 is

likely resolved. 



ASSESSMENT:  

1. Type 1 diabetes mellitus.

2. Diabetic ketoacidosis.

3. Cognitive impairment, likely.



DISCUSSION AND PLAN:  The patient's appetite has improved.  As such, we will give

her long-acting insulin, which is 22 units.  Also put her on NovoLog 8 units subcu

on a pre-meal basis.  We will give her a moderate sliding scale on top of that.  I

will interrupt laboratories.  We will repeat laboratories tomorrow morning.  If she

does well, which she can be transitioned out of the hospital first thing in the

morning, back on her routine.  Critical Care will continue to follow in this

location but when she arrives on the floor, I will sign off. 







Job ID:  [id number]

## 2018-12-29 NOTE — HP
REASON FOR ADMISSION:  DKA, urinary tract infection.



HISTORY OF PRESENTING ILLNESS:  The patient was not feeling well from yesterday.

She had burning urination.  Her fingerstick glucose was 512.  She took 26 units 
of

NovoLog insulin last evening.  This morning, when she woke up, she was still not

feeling good and in fact vomited once.  This prompted the patient's mom to call 
EMS,

and she was brought here.  She has no complaints of shortness of breath, cough, 
or

expectoration.  She has no complaints of chest pain or palpitation.  No history 
of

fever at home. 



PAST MEDICAL HISTORY AND PAST SURGICAL HISTORY:  

1. Diabetes mellitus, type 1.

2. Likely has underlying intellectual disability.

3. Depression.

4. Anxiety.



CURRENT MEDICATIONS:  

1. Lantus 22 units subcu daily.

2. NovoLog 8 units subcu three times daily.

3. Risperdal 2 mg p.o. q.h.s.

4. Fluoxetine 20 mg daily.

5. Wellbutrin  mg p.o. extended release daily.



ALLERGIES:  NO KNOWN DRUG ALLERGIES.



PERSONAL HISTORY:  Does not abuse alcohol or drugs.  No history of smoking.  She

ambulates by herself. 



FAMILY HISTORY:  Significant for diabetes in her grandparents.



REVIEW OF SYSTEMS:  CONSTITUTIONAL:  Negative for weight loss or gain, ability 
to

conduct usual activities. 

SKIN:  Negative for rash, itching. 

EYES:  Negative for double vision, pain. 

ENT/MOUTH:  Negative for nose bleeding, neck stiffness, pain, tenderness. 

CARDIOVASCULAR:  Negative for palpitations, dyspnea on exertion, orthopnea. 

RESPIRATORY:  Negative for shortness of breath, wheezing, cough, hemoptysis, 
fever

or night sweats. 

GASTROINTESTINAL:  Negative for poor appetite, abdominal pain, heartburn, nausea
,

vomiting, constipation, or diarrhea. 

GENITOURINARY:  Negative for urgency, frequency, dysuria, nocturia. 

MUSCULOSKELETAL:  Negative for pain, swelling. 

NEUROLOGIC/PSYCHIATRIC:  Negative for anxiety, depression. 

ALLERGY/IMMUNOLOGIC:  Negative for skin rash, bleeding tendency.



PHYSICAL EXAMINATION:

GENERAL:  The patient is a 28-year-old female, who is currently not in any acute

distress. 

VITAL SIGNS:  Blood pressure 126/86, pulse 120 per minute, respiratory rate 18 
per

minute, temperature 98 degrees Fahrenheit, and saturating 96% on room air. 

NECK:  Supple.  No elevated JVD. 

EYES:  Extraocular muscles intact.  Pupils reacting to light.  Oral cavity, 
mucous

membranes are dry.  No exudates or congestion. 

CARDIOVASCULAR SYSTEM:  S1 and S2 heard.  Tachycardic.  No murmur. 

RESPIRATORY SYSTEM:  Air entry 1+ bilateral.  No rales or rhonchi. 

ABDOMEN:  Soft.  Bowel sounds heard.  No tenderness, rigidity, or guarding.  No 
CV

angle tenderness. 

EXTREMITIES:  No peripheral edema or calf tenderness. 

VASCULAR SYSTEM:  Peripheral pulses 1+ bilateral.  No ischemic ulcerations or

gangrene. 

CENTRAL NERVOUS SYSTEM:  No gross focal deficits noted.  The patient is alert 
and

responds well to questions.  The patient sometimes has to think long time 
before she

gets to the answer, sometimes she cannot answer as she cannot recollect basic 
stuff

like her medications, her primary care doctor's name, etc.  The patient is seen

moving all four extremities. 

PSYCHIATRIC SYSTEM:  No obvious hallucinations or delusions.



LABORATORY DATA:  White count of 8, H and H 13 and 41, platelet count 263.  
Venous

blood gas done shows a pH of 7.28, bicarb is 12.  Serum bicarb is 15.  Serum 
glucose

was 266 on arrival.  BUN 12, creatinine 0.9.  Serum pregnancy test is negative.

Liver enzymes within normal limits.  Albumin is 4.2.  UA shows small leukocyte

esterase, 4 to 6 wbc's, 1+ bacteria.  Beta-hydroxybutyrate levels of 5.26. 



CLINICAL IMPRESSION AND PLAN:  The patient will be admitted to IMCU for mild

diabetic ketoacidosis with urinary tract infection.  Urine cultures have been

obtained.  She will be on ceftriaxone.  We will follow diabetic ketoacidosis

evidence based protocol.  She will be started at 3 units/hour of IV insulin.  
Likely,

her gap will close by this evening.  Once her gap closes, the patient can be

transferred to medical floor.  We will continue her home medications once they 
are

confirmed including bupropion, fluoxetine, and Risperdal.  Code status is full.

Power of  is her mom.  We will continue to closely monitor her.  She 
will be

fluid resuscitated per diabetic ketoacidosis protocol. 







Job ID:  [id number]



MTDD

## 2018-12-30 LAB
ANION GAP SERPL CALC-SCNC: 17 MMOL/L (ref 10–20)
BUN SERPL-MCNC: 5 MG/DL (ref 7–18.7)
CALCIUM SERPL-MCNC: 8.6 MG/DL (ref 7.8–10.44)
CHLORIDE SERPL-SCNC: 106 MMOL/L (ref 98–107)
CO2 SERPL-SCNC: 17 MMOL/L (ref 22–29)
CREAT CL PREDICTED SERPL C-G-VRATE: 144 ML/MIN (ref 70–130)
GLUCOSE SERPL-MCNC: 228 MG/DL (ref 70–105)
MAGNESIUM SERPL-MCNC: 1.7 MG/DL (ref 1.6–2.6)
POTASSIUM SERPL-SCNC: 3.5 MMOL/L (ref 3.5–5.1)
SODIUM SERPL-SCNC: 136 MMOL/L (ref 136–145)

## 2018-12-30 RX ADMIN — INSULIN LISPRO SCH UNIT: 100 INJECTION, SOLUTION INTRAVENOUS; SUBCUTANEOUS at 15:25

## 2018-12-30 RX ADMIN — INSULIN GLARGINE SCH MLS: 100 INJECTION, SOLUTION SUBCUTANEOUS at 08:09

## 2018-12-30 RX ADMIN — INSULIN LISPRO PRN UNIT: 100 INJECTION, SOLUTION INTRAVENOUS; SUBCUTANEOUS at 20:12

## 2018-12-30 RX ADMIN — INSULIN LISPRO PRN UNIT: 100 INJECTION, SOLUTION INTRAVENOUS; SUBCUTANEOUS at 15:26

## 2018-12-30 RX ADMIN — POTASSIUM CHLORIDE, DEXTROSE MONOHYDRATE AND SODIUM CHLORIDE SCH MLS: 150; 5; 450 INJECTION, SOLUTION INTRAVENOUS at 05:01

## 2018-12-30 RX ADMIN — INSULIN LISPRO SCH UNIT: 100 INJECTION, SOLUTION INTRAVENOUS; SUBCUTANEOUS at 08:08

## 2018-12-30 RX ADMIN — INSULIN LISPRO SCH: 100 INJECTION, SOLUTION INTRAVENOUS; SUBCUTANEOUS at 12:03

## 2018-12-30 RX ADMIN — BUPROPION HYDROCHLORIDE SCH MG: 150 TABLET, FILM COATED, EXTENDED RELEASE ORAL at 08:44

## 2018-12-30 NOTE — PRG
DATE OF SERVICE:  12/30/2018



SERVICE:  Pulmonary Medicine.



INTERVAL HISTORY:  The patient is really doing well from respiratory standpoint.

She is breathing comfortably this morning.  There has been no interval change to her

condition.  She denies any chest pain, nausea, vomiting, fevers, or chills.  She is

eating comfortably.  She has no complaints of shortness of breath or abdominal

discomfort. 



OBJECTIVE:  VITAL SIGNS:  Afebrile with a T-max of 99.2, pulse 105, blood pressure

118/77, respirations 17, and saturation 97% on room air. 

GENERAL:  The patient is awake and alert, in no apparent distress. 

LUNGS:  Decent air entry.  There is no prolonged expiratory phase.  No crackles or

wheezing appreciated. 

HEART:  Normal rate and regular. 

ABDOMEN:  Soft, nontender, and nondistended.  Bowel sounds are positive. 

MUSCULOSKELETAL:  No cyanosis or clubbing.  No pitting in the bilateral lower

extremities. 

NEUROLOGIC:  Grossly nonfocal.



LABORATORY DATA:  Bicarb 17.  Basic metabolic profile is otherwise unremarkable.

Magnesium 1.7 and phosphorus 1.7.  Blood cultures x2 and urine culture negative. 



ASSESSMENT:  

1. Diabetic ketoacidosis.

2. Type 1 diabetes mellitus.

3. Cognitive impairment.



DISCUSSION AND PLAN:  The patient is doing fine from respiratory standpoint.  At

this point, she has no further requirements for inpatient pulmonary critical care

opinion, and I will sign off.  Please call with additional questions or concerns

moving forward. 







Job ID:  [id number]

## 2018-12-30 NOTE — PDOC.PN
- Subjective


Encounter Start Date: 12/30/18


Encounter Start Time: 11:15


Subjective: awake, not in distress


-: no trouble passing urine





- Objective


Resuscitation Status - Order Detail:





12/29/18 11:25


Resuscitation Status Routine 


   Resuscitation Status: FULL: Full Resuscitation








MAR Reviewed: Yes


Vital Signs & Weight: 


 Vital Signs (12 hours)











  Temp Pulse Resp BP Pulse Ox


 


 12/30/18 07:04  99.2 F  105 H  17  118/77  97


 


 12/30/18 04:00  99.1 F  105 H  18  119/83  97








 Weight











Weight                         155 lb














I&O: 


 











 12/29/18 12/30/18 12/31/18





 06:59 06:59 06:59


 


Intake Total  780 


 


Balance  780 











Result Diagrams: 


 12/29/18 08:46





 12/30/18 05:11


Additional Labs: 


 Accuchecks











  12/30/18 12/30/18 12/29/18





  11:07 05:10 20:08


 


POC Glucose  119 H  207 H  211 H














  12/29/18 12/29/18 12/29/18





  18:36 17:08 16:27


 


POC Glucose  105  130 H  145 H














  12/29/18 12/29/18 12/29/18





  15:00 14:02 13:06


 


POC Glucose  155 H  165 H  143 H














Phys Exam





- Physical Examination


HEENT: PERRLA, moist MMs


Neck: no JVD, supple


Respiratory: no wheezing, no rales


Cardiovascular: RRR, no significant murmur


Gastrointestinal: soft, non-tender, positive bowel sounds


Musculoskeletal: no edema, pulses present


Neurological: non-focal, moves all 4 limbs





Dx/Plan


(1) DKA, type 1


Code(s): E10.10 - TYPE 1 DIABETES MELLITUS WITH KETOACIDOSIS WITHOUT COMA   

Status: Acute   


Qualifiers: 


   Diabetes mellitus complication detail: without coma   Qualified Code(s): 

E10.10 - Type 1 diabetes mellitus with ketoacidosis without coma   





(2) UTI (urinary tract infection)


Status: Acute   


Qualifiers: 


   Urinary tract infection type: acute cystitis   Hematuria presence: without 

hematuria   Qualified Code(s): N30.00 - Acute cystitis without hematuria   





(3) Anxiety and depression


Code(s): F41.8 - OTHER SPECIFIED ANXIETY DISORDERS   Status: Chronic   





- Plan


is on home dose lantus with humalog cov


-: gentle iv hydration, encourage po intake


-: gap is slowly closing


-: add cipro for uti, cultures are contaminated


-: dc plan in am home





* .








Review of Systems





- Medications/Allergies


Allergies/Adverse Reactions: 


 Allergies











Allergy/AdvReac Type Severity Reaction Status Date / Time


 


No Known Drug Allergies Allergy   Verified 06/18/14 22:11











Medications: 


 Current Medications





Acetaminophen (Tylenol)  650 mg PO Q4H PRN


   PRN Reason: Headache/Fever/Mild Pain (1-3)


Bupropion HCl (Wellbutrin Xl)  300 mg PO QAM [facility]


   Last Admin: 12/30/18 08:44 Dose:  300 mg


Dextrose/Water (Dextrose 50%)  25 gm SLOW IVP PRN PRN


   PRN Reason: Hypoglycemia


Enoxaparin Sodium (Lovenox)  40 mg SC 0900 [facility]


   Last Admin: 12/30/18 08:08 Dose:  40 mg


Fluoxetine HCl (Prozac)  20 mg PO DAILY [facility]


   Last Admin: 12/30/18 08:09 Dose:  20 mg


Glucagon (Glucagon)  1 mg IM PRN PRN


   PRN Reason: Hypoglycemia


Insulin Glargine 20 units/ (Miscellaneous Medication)  0.2 mls @ 0 mls/hr SC 

[facility]


   Last Admin: 12/30/18 08:09 Dose:  0.2 mls


Dextrose/Water (D5w)  1,000 mls @ 0 mls/hr IV .Q0M PRN


   PRN Reason: Hypoglycemia


Potassium Phosphate 30 mmol/ (Sodium Chloride)  510 mls @ 63.75 mls/hr IVPB NOW 

[facility]


   Stop: 12/30/18 14:44


   Last Admin: 12/30/18 08:08 Dose:  510 mls


Insulin Human Lispro (Humalog)  6 units SC [facility]


   Last Admin: 12/30/18 12:03 Dose:  Not Given


Insulin Human Lispro (Humalog)  0 units SC .MODERATE SLIDING SC PRN


   PRN Reason: Moderate Correctional Scale


Risperidone (Risperidone)  2 mg PO HS PRN


   PRN Reason: Insomnia


Senna/Docusate Sodium (Senokot S)  2 tab PO BID PRN


   PRN Reason: Constipation

## 2018-12-31 VITALS — DIASTOLIC BLOOD PRESSURE: 81 MMHG | TEMPERATURE: 99 F | SYSTOLIC BLOOD PRESSURE: 116 MMHG

## 2018-12-31 LAB
ANION GAP SERPL CALC-SCNC: 20 MMOL/L (ref 10–20)
BUN SERPL-MCNC: 7 MG/DL (ref 7–18.7)
CALCIUM SERPL-MCNC: 8.9 MG/DL (ref 7.8–10.44)
CHLORIDE SERPL-SCNC: 107 MMOL/L (ref 98–107)
CO2 SERPL-SCNC: 13 MMOL/L (ref 22–29)
CREAT CL PREDICTED SERPL C-G-VRATE: 144 ML/MIN (ref 70–130)
GLUCOSE SERPL-MCNC: 281 MG/DL (ref 70–105)
POTASSIUM SERPL-SCNC: 4.2 MMOL/L (ref 3.5–5.1)
SODIUM SERPL-SCNC: 136 MMOL/L (ref 136–145)

## 2018-12-31 RX ADMIN — INSULIN LISPRO SCH: 100 INJECTION, SOLUTION INTRAVENOUS; SUBCUTANEOUS at 11:49

## 2018-12-31 RX ADMIN — INSULIN GLARGINE SCH MLS: 100 INJECTION, SOLUTION SUBCUTANEOUS at 07:46

## 2018-12-31 RX ADMIN — BUPROPION HYDROCHLORIDE SCH MG: 150 TABLET, FILM COATED, EXTENDED RELEASE ORAL at 07:45

## 2018-12-31 RX ADMIN — INSULIN LISPRO SCH UNIT: 100 INJECTION, SOLUTION INTRAVENOUS; SUBCUTANEOUS at 07:45

## 2018-12-31 RX ADMIN — INSULIN LISPRO PRN UNIT: 100 INJECTION, SOLUTION INTRAVENOUS; SUBCUTANEOUS at 06:32

## 2018-12-31 NOTE — DIS
DATE OF ADMISSION:  12/29/2018



DATE OF DISCHARGE:  12/31/2018



DISCHARGE DISPOSITION:  Is to home.



PRIMARY DISCHARGE DIAGNOSES:  

1. Diabetic ketoacidosis with history of diabetes mellitus type 1.

2. Urinary tract infection.

3. Underlying intellectual disability.

4. Anxiety.

5. Depression.



PROCEDURES DONE DURING HOSPITALIZATION:  The patient has had blood cultures x2, no

growth.  Urine culture was contaminated.  White count of 8, H and H of 13 and 41,

platelet count 263, MCV is 95.  Discharge fingerstick glucose is ranging from 195 to

236.  Serum pregnancy test was negative.  Beta-hydroxybutyrate level is 5.26.

Venous blood gas done on admission showed a bicarb of 12, pH of 7.28. 



DISCHARGE MEDICATIONS:  

1. Ciprofloxacin 500 mg p.o. twice daily for a period of 4 days.

2. Fluoxetine 20 mg daily.

3. Lantus 22 units subcutaneous daily.

4. NovoLog 8 units subcutaneous 3 times daily.

5. Wellbutrin extended release 300 mg daily.

6. Risperdal 2 mg p.o. at bedtime.



ALLERGIES:  NO KNOWN DRUG ALLERGIES.



DISCHARGE PLAN:  The patient to follow up with primary care physician in 1 week.



BRIEF COURSE DURING HOSPITALIZATION:  The patient initially was brought to emergency

room as she is not feeling well and had burning urination as well.  Her fingerstick

glucose at home was 512.  The patient was essentially admitted for mild DKA and

urinary tract infection.  She was placed on IV antibiotics and has been transitioned

over to oral ciprofloxacin.  She was on IV insulin and was on DKA protocol.  She was

downgraded to medical floor in 24 hours.  The patient's fingerstick glucose is

slowly receding back to her baseline.  She has been advised to check fingerstick

glucose twice daily, record for 10 days, to follow up with primary care physician

for any increase in her insulin.  She is otherwise hemodynamically stable, eating

well, and ambulating prior to discharge.  Please see a face-to-face documentation

for the day of discharge on [employer]. 







Job ID:  [id number]

## 2018-12-31 NOTE — PDOC.PN
- Subjective


Encounter Start Date: 12/31/18


Encounter Start Time: 09:40


Subjective: feels good, wants to go home


-: is eating well, amb in room


-: no nausea or urinary freq/urgency





- Objective


Resuscitation Status - Order Detail:





12/29/18 11:25


Resuscitation Status Routine 


   Resuscitation Status: FULL: Full Resuscitation








MAR Reviewed: Yes


Vital Signs & Weight: 


 Vital Signs (12 hours)











  Temp Pulse Resp BP BP Pulse Ox


 


 12/31/18 08:25   99    


 


 12/31/18 08:00  99.0 F  99  18   116/81  96


 


 12/31/18 04:00  98.8 F  120 H  18  131/85   93 L


 


 12/31/18 00:00  99 F  122 H  18  128/86   96








 Weight











Weight                         155 lb














I&O: 


 











 12/30/18 12/31/18 01/01/19





 06:59 06:59 06:59


 


Intake Total 780 600 240


 


Balance 780 600 240











Result Diagrams: 


 12/29/18 08:46





 12/31/18 06:01


Additional Labs: 


 Accuchecks











  12/31/18 12/30/18 12/30/18





  06:13 19:12 15:21


 


POC Glucose  263 H  195 H  370 H














  12/30/18





  11:07


 


POC Glucose  119 H














Phys Exam





- Physical Examination


HEENT: PERRLA, moist MMs


Neck: no JVD, supple


Respiratory: no wheezing, no rales


Cardiovascular: RRR, no significant murmur


Gastrointestinal: soft, non-tender, positive bowel sounds


Musculoskeletal: no edema, pulses present


Neurological: non-focal, moves all 4 limbs





Dx/Plan


(1) DKA, type 1


Code(s): E10.10 - TYPE 1 DIABETES MELLITUS WITH KETOACIDOSIS WITHOUT COMA   

Status: Acute   


Qualifiers: 


   Diabetes mellitus complication detail: without coma   Qualified Code(s): 

E10.10 - Type 1 diabetes mellitus with ketoacidosis without coma   





(2) UTI (urinary tract infection)


Status: Acute   


Qualifiers: 


   Urinary tract infection type: acute cystitis   Hematuria presence: without 

hematuria   Qualified Code(s): N30.00 - Acute cystitis without hematuria   





(3) Anxiety and depression


Code(s): F41.8 - OTHER SPECIFIED ANXIETY DISORDERS   Status: Chronic   





- Plan


may dc home


-: cipro x 4 days


-: to check fingerstick twice daily and record for 10 days to f/u with PCP





* .

## 2019-03-15 NOTE — RAD
PORTABLE AP CHEST:

 

Date:  03/15/19 

 

HISTORY:  

Fever. 

 

COMPARISON:  

02/08/18. 

 

FINDINGS:

Cardiac silhouette and pulmonary vasculature are within normal limits. Lungs remain clear. There has 
been no interval change when compared to the prior exam. 

 

IMPRESSION: 

No acute cardiopulmonary process. 

 

 

POS: [facility]

## 2019-03-16 NOTE — PDOC.PN
- Subjective


Encounter Start Date: 03/16/19


Encounter Start Time: 09:30


-: old records requested/rev





Patient seen and examined. No new complaints. No overnight events





- Objective


Resuscitation Status - Order Detail:





03/15/19 10:04


Resuscitation Status Routine 


   Resuscitation Status: FULL: Full Resuscitation








MAR Reviewed: Yes


Vital Signs & Weight: 


 Vital Signs (12 hours)











  Temp Pulse Ox


 


 03/16/19 08:16   100


 


 03/16/19 07:12  99.3 F 


 


 03/16/19 04:00  98.8 F 


 


 03/16/19 00:00  98.2 F 








 Weight











Weight                         147 lb 9.6 oz











 Most Recent Monitor Data











Heart Rate from ECG            114


 


NIBP                           104/58


 


NIBP BP-Mean                   73


 


Respiration from ECG           23


 


SpO2                           99














I&O: 


 











 03/15/19 03/16/19 03/17/19





 06:59 06:59 06:59


 


Intake Total  4378 500


 


Output Total  1900 


 


Balance  2478 500











Result Diagrams: 


 03/16/19 05:30





 03/16/19 05:30


Additional Labs: 


 Accuchecks











  03/16/19 03/16/19 03/16/19





  10:05 09:04 08:06


 


POC Glucose  204 H  234 H  126 H














  03/16/19 03/16/19 03/16/19





  07:03 06:07 05:08


 


POC Glucose  122 H  126 H  139 H














  03/16/19 03/16/19 03/16/19





  04:36 03:06 02:12


 


POC Glucose  141 H  126 H  127 H














  03/16/19 03/16/19 03/15/19





  01:08 00:13 23:09


 


POC Glucose  111 H  110  108














  03/15/19 03/15/19 03/15/19





  21:59 21:07 20:02


 


POC Glucose  138 H  158 H  196 H














  03/15/19 03/15/19 03/15/19





  19:14 18:18 17:22


 


POC Glucose  243 H  233 H  246 H














  03/15/19 03/15/19 03/15/19





  16:40 15:05 14:36


 


POC Glucose  229 H  167 H  188 H














  03/15/19 03/15/19 03/15/19





  14:08 12:58 11:49


 


POC Glucose  169 H  112 H  170 H














  03/15/19





  11:10


 


POC Glucose  200 H











EKG Reviewed by me: Yes (nsr)





Phys Exam





- Physical Examination


Constitutional: NAD


HEENT: PERRLA, moist MMs, sclera anicteric


Neck: no JVD, supple


Respiratory: no wheezing, no rales, no rhonchi


Cardiovascular: RRR, no significant murmur, no rub


Gastrointestinal: soft, non-tender, no distention, positive bowel sounds


Musculoskeletal: no edema, pulses present


Neurological: non-focal, normal sensation, moves all 4 limbs


Lymphatic: no nodes


Psychiatric: normal affect, A&O x 3


Skin: no rash, normal turgor





Dx/Plan


(1) Abnormal blood electrolyte level


Code(s): E87.8 - OTH DISORDERS OF ELECTROLYTE AND FLUID BALANCE, NEC   Status: 

Acute   





(2) DKA, type 1


Code(s): E10.10 - TYPE 1 DIABETES MELLITUS WITH KETOACIDOSIS WITHOUT COMA   

Status: Acute   


Qualifiers: 


 





(3) Anxiety and depression


Code(s): F41.8 - OTHER SPECIFIED ANXIETY DISORDERS   Status: Chronic   





(4) Diabetes mellitus type 1


Status: Chronic   





- Plan


cont current plan of care





* DKA has resolved


* today will replace magnesium, potassium phosphate


* will start basal insulin and overlap insulin drip for few hours and then will 

dc insulin drip and consider transfer to medical floor


* today insulin as per sliding scale along with prandial insulin every 4 hourly


* medication reviewed as below


* symptomatic treatment


* continue IVF


* repeat labs tomorrow.








Review of Systems





- Review of Systems


ENT: negative: Ear Pain, Ear Discharge, Nose Pain, Nose Discharge, Nose 

Congestion, Mouth Pain, Mouth Swelling, Throat Pain, Throat Swelling, Other


Respiratory: negative: Cough, Dry, Shortness of Breath, Hemoptysis, SOB with 

Excertion, Pleuritic Pain, Sputum, Wheezing


Cardiovascular: negative: chest pain, palpitations, orthopnea, paroxysmal 

nocturnal dyspnea, edema, light headedness, other


Gastrointestinal: negative: Nausea, Vomiting, Abdominal Pain, Diarrhea, 

Constipation, Melena, Hematochezia, Other


Genitourinary: negative: Dysuria, Frequency, Incontinence, Hematuria, Retention

, Other


Musculoskeletal: negative: Neck Pain, Shoulder Pain, Arm Pain, Back Pain, Hand 

Pain, Leg Pain, Foot Pain, Other


Skin: negative: Rash, Lesions, Maik, Bruising, Other





- Medications/Allergies


Allergies/Adverse Reactions: 


 Allergies











Allergy/AdvReac Type Severity Reaction Status Date / Time


 


No Known Drug Allergies Allergy   Verified 03/15/19 14:49











Medications: 


 Current Medications





Acetaminophen (Tylenol)  1,000 mg PO Q6H PRN


   PRN Reason: Mild Pain (1-3)


Artificial Tears (Tears Renewed 15ml Bottle)  2 drop EA EYE PRN PRN


   PRN Reason: Dry Eyes


Bisacodyl (Dulcolax)  10 mg [place] DAILYPRN PRN


   PRN Reason: Constipation


Bupropion HCl (Wellbutrin Xl)  300 mg PO DAILY [facility]


   Last Admin: 03/16/19 09:07 Dose:  300 mg


Dextrose/Water (Dextrose 50%)  25 gm SLOW IVP PRN PRN


   PRN Reason: Hypoglycemia


Famotidine (Pepcid)  20 mg PO BID [facility]


   Last Admin: 03/16/19 09:08 Dose:  20 mg


Fluoxetine HCl (Prozac)  40 mg PO DAILY [facility]


   Last Admin: 03/16/19 09:08 Dose:  40 mg


Glucagon (Glucagon)  1 mg IM PRN PRN


   PRN Reason: Hypoglycemia


Guaifenesin (Robitussin Sf)  200 mg PO Q4H PRN


   PRN Reason: Cough


Insulin Human Regular 100 (units/ Sodium Chloride)  101 mls @ 0 mls/hr IVPB INF 

[facility]; Protocol


Potassium Phosphate 30 mmol/ (Sodium Chloride)  260 mls @ 43.333 mls/hr IVPB 

1000 [facility]


   Stop: 03/16/19 16:00


   Last Admin: 03/16/19 09:07 Dose:  260 mls


Insulin Glargine 22 units/ (Miscellaneous Medication)  0.22 mls @ 0 mls/hr SC 

QAM [facility]


   Last Admin: 03/16/19 09:08 Dose:  0.22 mls


Dextrose/Water (D5w)  1,000 mls @ 0 mls/hr IV .Q0M PRN


   PRN Reason: Hypoglycemia


Sodium Chloride (1/2 Normal Saline)  1,000 mls @ 100 mls/hr IV .Q10H [facility]


   Last Admin: 03/16/19 09:56 Dose:  1,000 mls


Magnesium Sulfate 3 gm/ Sodium (Chloride)  106 mls @ 100 mls/hr IVPB NOW [facility]


   Stop: 03/16/19 13:00


Insulin Human Lispro (Humalog)  8 units SC TID-WM [facility]


Insulin Human Lispro (Humalog)  0 units SC .MODERATE SLIDING SC PRN


   PRN Reason: Moderate Correctional Scale


Insulin Human Lispro (Humalog)  0 units SC .BEDTIME SLIDING SC PRN


   PRN Reason: Bedtime Correctional Scale


Loperamide HCl (Imodium)  2 mg PO PRN PRN


   PRN Reason: Diarrhea/Loose Stools


Loratadine (Claritin)  10 mg PO DAILYPRN PRN


   PRN Reason: Sinus Symptoms


Mineral Oil/White Petrolatum (Eucerin Cream)  0 gm TOP BIDPRN PRN


   PRN Reason: Dry Skin


Ondansetron HCl (Zofran Odt)  4 mg PO Q6H PRN


   PRN Reason: Nausea/Vomiting


Ondansetron HCl (Zofran)  4 mg IVP Q6H PRN


   PRN Reason: Nausea/Vomiting


Risperidone (Risperidone)  4 mg PO HS JEANA


Senna/Docusate Sodium (Senokot S)  2 tab PO BID PRN


   PRN Reason: Constipation


Sodium Chloride (Ocean Nasal Spray 0.65%)  0 ml EA NARE QIDPRN PRN


   PRN Reason: Nasal Congestion


Throat Lozenges (Cepastat Lozenges)  1 daniela PO Q2H PRN


   PRN Reason: Sore Throat


Zolpidem Tartrate (Ambien)  5 mg PO HSPRN PRN


   PRN Reason: Insomnia

## 2019-03-17 NOTE — PDOC.PN
- Subjective


Encounter Start Date: 03/17/19


Encounter Start Time: 09:20





Patient seen and examined. No new complaints. No overnight events





- Objective


Resuscitation Status - Order Detail:





03/15/19 10:04


Resuscitation Status Routine 


   Resuscitation Status: FULL: Full Resuscitation








MAR Reviewed: Yes


Vital Signs & Weight: 


 Vital Signs (12 hours)











  Temp Pulse Resp BP Pulse Ox


 


 03/17/19 08:00  98.3 F  102 H  18  118/68  95


 


 03/17/19 04:00  98.2 F  96  18  118/70  98


 


 03/17/19 00:35  98.1 F  99  18  120/72  97








 Weight











Weight                         147 lb 9.6 oz











 Most Recent Monitor Data











Heart Rate from ECG            114


 


NIBP                           104/58


 


NIBP BP-Mean                   73


 


Respiration from ECG           23


 


SpO2                           99














I&O: 


 











 03/16/19 03/17/19 03/18/19





 06:59 06:59 06:59


 


Intake Total 4378 4600 


 


Output Total 1900  


 


Balance 2478 4600 











Result Diagrams: 


 03/17/19 03:55





 03/17/19 03:55


Additional Labs: 


 Accuchecks











  03/17/19 03/17/19 03/17/19





  08:03 04:05 00:38


 


POC Glucose  345 H  160 H  151 H














  03/16/19 03/16/19 03/16/19





  20:20 16:32 10:05


 


POC Glucose  155 H  107  204 H














Phys Exam





- Physical Examination


Constitutional: NAD


HEENT: PERRLA, moist MMs, sclera anicteric


Neck: no JVD, supple


Respiratory: no wheezing, no rales, no rhonchi


Cardiovascular: RRR, no significant murmur, no rub


Gastrointestinal: soft, non-tender, no distention, positive bowel sounds


Musculoskeletal: no edema, pulses present


Neurological: non-focal, normal sensation


Lymphatic: no nodes


Psychiatric: normal affect, A&O x 3


Skin: no rash, normal turgor





Dx/Plan


(1) Abnormal blood electrolyte level


Code(s): E87.8 - OTH DISORDERS OF ELECTROLYTE AND FLUID BALANCE, NEC   Status: 

Acute   





(2) DKA, type 1


Code(s): E10.10 - TYPE 1 DIABETES MELLITUS WITH KETOACIDOSIS WITHOUT COMA   

Status: Acute   


Qualifiers: 


 





(3) Anxiety and depression


Code(s): F41.8 - OTHER SPECIFIED ANXIETY DISORDERS   Status: Chronic   





(4) Diabetes mellitus type 1


Status: Chronic   





- Plan


cont current plan of care





* medication reviewed as below


* symptomatic treatment


* replace potassium phosphate.


* repeat labs tomorrow








Review of Systems





- Review of Systems


ENT: negative: Ear Pain, Ear Discharge, Nose Pain, Nose Discharge, Nose 

Congestion, Mouth Pain, Mouth Swelling, Throat Pain, Throat Swelling, Other


Respiratory: negative: Cough, Dry, Shortness of Breath, Hemoptysis, SOB with 

Excertion, Pleuritic Pain, Sputum, Wheezing


Cardiovascular: negative: chest pain, palpitations, orthopnea, paroxysmal 

nocturnal dyspnea, edema, light headedness, other


Gastrointestinal: negative: Nausea, Vomiting, Abdominal Pain, Diarrhea, 

Constipation, Melena, Hematochezia, Other


Genitourinary: negative: Dysuria, Frequency, Incontinence, Hematuria, Retention

, Other


Musculoskeletal: negative: Neck Pain, Shoulder Pain, Arm Pain, Back Pain, Hand 

Pain, Leg Pain, Foot Pain, Other





- Medications/Allergies


Allergies/Adverse Reactions: 


 Allergies











Allergy/AdvReac Type Severity Reaction Status Date / Time


 


No Known Drug Allergies Allergy   Verified 03/15/19 14:49











Medications: 


 Current Medications





Acetaminophen (Tylenol)  1,000 mg PO Q6H PRN


   PRN Reason: Mild Pain (1-3)


   Last Admin: 03/16/19 12:18 Dose:  1,000 mg


Artificial Tears (Tears Renewed 15ml Bottle)  2 drop EA EYE PRN PRN


   PRN Reason: Dry Eyes


Bisacodyl (Dulcolax)  10 mg [place] DAILYPRN PRN


   PRN Reason: Constipation


Bupropion HCl (Wellbutrin Xl)  300 mg PO DAILY [facility]


   Last Admin: 03/17/19 08:07 Dose:  300 mg


Dextrose/Water (Dextrose 50%)  25 gm SLOW IVP PRN PRN


   PRN Reason: Hypoglycemia


Famotidine (Pepcid)  20 mg PO BID [facility]


   Last Admin: 03/17/19 08:07 Dose:  20 mg


Fluoxetine HCl (Prozac)  40 mg PO DAILY [facility]


   Last Admin: 03/17/19 08:07 Dose:  40 mg


Glucagon (Glucagon)  1 mg IM PRN PRN


   PRN Reason: Hypoglycemia


Guaifenesin (Robitussin Sf)  200 mg PO Q4H PRN


   PRN Reason: Cough


Insulin Human Regular 100 (units/ Sodium Chloride)  101 mls @ 0 mls/hr IVPB INF 

[facility]; Protocol


Insulin Glargine 22 units/ (Miscellaneous Medication)  0.22 mls @ 0 mls/hr SC 

QAM [facility]


   Last Admin: 03/17/19 08:56 Dose:  0.22 mls


Dextrose/Water (D5w)  1,000 mls @ 0 mls/hr IV .Q0M PRN


   PRN Reason: Hypoglycemia


Sodium Chloride (1/2 Normal Saline)  1,000 mls @ 100 mls/hr IV .Q10H [facility]


   Last Admin: 03/17/19 06:12 Dose:  1,000 mls


Potassium Phosphate 30 mmol/ (Sodium Chloride)  510 mls @ 83.3 mls/hr IVPB 0800 

[facility]


   Stop: 03/17/19 16:00


   Last Admin: 03/17/19 08:06 Dose:  510 mls


Insulin Human Lispro (Humalog)  8 units SC TID-[facility]


   Last Admin: 03/17/19 08:04 Dose:  8 unit


Insulin Human Lispro (Humalog)  0 units SC .MODERATE SLIDING SC PRN


   PRN Reason: Moderate Correctional Scale


Insulin Human Lispro (Humalog)  0 units SC .BEDTIME SLIDING SC PRN


   PRN Reason: Bedtime Correctional Scale


Loperamide HCl (Imodium)  2 mg PO PRN PRN


   PRN Reason: Diarrhea/Loose Stools


Loratadine (Claritin)  10 mg PO DAILYPRN PRN


   PRN Reason: Sinus Symptoms


Mineral Oil/White Petrolatum (Eucerin Cream)  0 gm TOP BIDPRN PRN


   PRN Reason: Dry Skin


Ondansetron HCl (Zofran Odt)  4 mg PO Q6H PRN


   PRN Reason: Nausea/Vomiting


Ondansetron HCl (Zofran)  4 mg IVP Q6H PRN


   PRN Reason: Nausea/Vomiting


Phosphorus (Kphos Neutral)  500 mg PO QID-[facility]


   Stop: 03/18/19 08:01


   Last Admin: 03/17/19 08:06 Dose:  Not Given


Potassium Chloride (K-Dur)  20 meq PO BID-[facility]


   Stop: 03/17/19 17:01


   Last Admin: 03/17/19 08:05 Dose:  20 meq


Risperidone (Risperidone)  4 mg PO [facility]


   Last Admin: 03/16/19 21:12 Dose:  4 mg


Senna/Docusate Sodium (Senokot S)  2 tab PO BID PRN


   PRN Reason: Constipation


Sodium Chloride (Ocean Nasal Spray 0.65%)  0 ml EA NARE QIDPRN PRN


   PRN Reason: Nasal Congestion


Throat Lozenges (Cepastat Lozenges)  1 daniela PO Q2H PRN


   PRN Reason: Sore Throat


Zolpidem Tartrate (Ambien)  5 mg PO HSPRN PRN


   PRN Reason: Insomnia

## 2019-03-18 NOTE — DIS
DATE OF ADMISSION:  03/15/2019



DATE OF DISCHARGE:  03/18/2019



PRIMARY CARE PHYSICIAN:  Caron Rose.



DISCHARGE DISPOSITION:  Home.



PRIMARY DISCHARGE DIAGNOSES:  

1. Diabetic ketoacidosis, type 1, resolved.

2. Abnormal blood electrolytes, corrected.



SECONDARY DISCHARGE DIAGNOSES:  

1. Diabetes type 1.

2. Anxiety.

3. Depression.



PRIMARY PROCEDURE/OPERATION:  None.



RADIOLOGICAL INVESTIGATION:  Chest x-ray normal.



SIGNIFICANT LABORATORIES:  Hemoglobin 10.7, creatinine 0.63.  Electrolytes normal.

Urinalysis unremarkable other than glucosuria, ketonuria.  Serum ketone 0.27.  Blood

culture and urine culture negative. 



DISCHARGE MEDICATIONS:  

1. Wellbutrin  mg p.o. daily.

2. Prozac 40 mg daily.

3. Risperidone 4 mg p.o. at bedtime p.r.n.

4. Lantus insulin 22 units subcu daily.

5. NovoLog insulin 8 units subcu t.i.d.



CONTRAINDICATION:  None.



CODE STATUS:  Full code.



INPATIENT CONSULTANT:  None.



ALLERGIES:  NO KNOWN DRUG ALLERGY.



DISCHARGE PLAN:  Posthospital, the patient will make appointment with primary care

physician in one week. 



HOSPITAL COURSE:  A 28-year-old female with above-mentioned medical problem, who was

admitted by Dr. Sagastume on March 15, 2019.  Please see his H and P for further details.

 She came to ER for nausea, vomiting, and abdominal pain.  She was diagnosed with

DKA.  She was admitted to the IMCU.  She was treated with diabetes ketoacidosis

protocol treatment.  Next day, her DKA resolved.  She had abnormal electrolytes,

which was corrected.  We transferred her to medical floor.  She was doing well on

her home regimen while in hospital.  The patient is medically stable for discharge

today. 



I have seen and examined this patient at bedside personally and diabetes education

provided.  Her physical examination is completely normal and her vitals are stable.

She claimed that she has all her previous medications. 







Job ID:  [id number]

## 2019-11-16 NOTE — HP
PRIMARY CARE PROVIDER:  Caron Rose.



CHIEF COMPLAINT:  Nausea and vomiting.



HISTORY OF PRESENT ILLNESS:  A 29-year-old female with known history of type 1

diabetes, on insulin therapy, admitted with acute onset of nausea and vomiting that

started around 2 a.m. earlier today.  The patient reportedly has had about 7

episodes of emesis.  She, however, denied hematemesis, melena, dysuria, hematuria,

or hematochezia.  The patient reported ill feeling, but denied fever, chills, or

rigors.  She has been having abdominal pain, which she attributed to abdominal

cramps related to her menstrual period.  In the ER, the patient was evaluated and

found to have hyperglycemia associated with ketosis and was treated with Zofran,

NovoLog, regular insulin IV, as well as 2 L of normal saline with improvement in

symptoms.  The patient reportedly missed her Lantus last night.  She also has not

been very compliant with her medications as she felt that her blood sugar was 100

and that if she take insulin that will bring it too low.  She denies change in bowel

habits, dysuria, or headache. 



PAST MEDICAL HISTORY:  

1. Diabetes mellitus.

2. Depression.

3. Prior history of substance abuse.



PAST SURGICAL HISTORY:  None.



FAMILY HISTORY:  Significant for hypertension in mother. Otherwise unremarkable.



SOCIAL HISTORY:  The patient lives with mother and family.  Former smoker who quit

about 10 years ago.  She has a history of marijuana use as well, but currently not

using.  Denied IV drug use, but admitted to occasional alcohol use. 



ALLERGIES:  NO KNOWN DRUG ALLERGIES REPORTED.



HOME MEDICATIONS:  

1. Bupropion  mg daily.

2. Fluoxetine 20 mg p.o. daily.

3. Risperdal 2 mg daily at bedtime.

4. Lantus 22 units at bedtime.

5. Humalog 6 units t.i.d. with meals.



REVIEW OF SYSTEMS:  12-point review of systems performed and was negative other than

pertinent positives and negatives included in the history of present illness. 



PHYSICAL EXAMINATION:

VITAL SIGNS:  Initial vitals on presentation to the ER showed blood pressure 122/75,

pulse 115, respiratory rate 20, temperature 98, pain 0/10, and SpO2 97% on room air.

 Most current vitals showed temperature 98.4, pulse 117, respiratory rate 20, SpO2

of 99% on room air, and blood pressure 111/61. 

GENERAL:  Young female, in no distress.  Afebrile.  Anicteric.  Acyanotic. 

HEENT:  Normocephalic, atraumatic.  Oral mucosa is moist. 

NECK:  Supple.  Nontender with good range of motion.  No JVD or masses appreciated. 

CARDIOVASCULAR:  Regular rhythm, but tachycardic.  Normal heart sounds one and two. 

RESPIRATORY:  Good air entry bilaterally with no crackle or rhonchi or use of

accessory muscles. 

GI:  Full, soft, nontender, and nondistended with normal bowel sounds. 

EXTREMITIES:  Grossly normal looking, atraumatic, with no obvious edema or erythema. 

CNS:  Conscious, alert, oriented x3 with appropriate mental status.  Cranial nerves

2 through 12 are grossly intact.  The patient moves all extremities. 

PSYCHIATRIC:  Normal affect.



DIAGNOSTIC DATA:  CBC today showed WBC count of 11.3, hemoglobin of 13.9, and MCV of

95.5. 



Venous blood gas performed earlier today showed pH of 7.305. 



CMP earlier today showed sodium 135, potassium 3.8, chloride 105, CO2 of 16, anion

gap 18, BUN 14, creatinine 0.89, glucose 372, calcium 9.2, phosphorus 3.1, magnesium

1.8, total bilirubin 0.7, AST 10, ALT 10, alkaline phosphatase 102, total protein

7.3, albumin 4.4, globulin 2.9. 



Lipase is 5. 



Pregnancy test was negative. 



Urinalysis showed turbid urine with pH of 5.5, specific gravity of 1.034, urine

protein of 30 mg/dL, glucose more than a 1000, ketones greater than 150, blood 3+,

and negative nitrite, bilirubin, and leukocyte esterase. 



Microscopy showed rbc more than 50, wbc none. 



Beta hydroxybutyrate was elevated at 4.51.



ASSESSMENT:  

1. Type 1 diabetes mellitus with early mild diabetic ketoacidosis.

2. Nausea and vomiting.

3. Volume depletion due to nausea and vomiting.

4. Metabolic acidosis.

5. Noncompliance.



PLAN:  

1. We will start the patient on IV fluid therapy with lactated Ringer's at 200 mL/h.

2. We will also restart the patient on Lantus and sliding scale insulin.  We will

also restart diet as tolerated. 

3. Antiemetic as needed will be provided.

4. We will monitor electrolytes and replete as needed.

5. Further treatment to follow depending on hospital course.

6. DVT prophylaxis with Lovenox will be provided.







Job ID:  [id number]

## 2019-11-16 NOTE — RAD
Exam: Chest one view



HISTORY:Emesis.



Comparison: 11/12/2016



FINDINGS:

Cardiac silhouette: Normal

Aorta: Unremarkable

Pulmonary vessels: Normal

Costophrenic angles: Clear



LUNGS: No masses or consolidation.



Pneumothorax: None



Osseous abnormalities: None



IMPRESSION: No acute cardiopulmonary process.



Reported By: Eric Gibson 

Electronically Signed:  11/16/2019 8:51 AM

## 2019-11-17 NOTE — PDOC.HOSPP
- Subjective


Encounter Date: 11/17/19


Encounter Time: 08:52


Subjective: 


28 y/o female with DM admitted with acute onset of nausea and vomiting. Found 

to have tachycardia and hyperglycemia with blood glucose in 300's associated 

with metabolic acidosis ketonuria consistent with DKA. Started on subcut 

insulin and IVF with improvement. Developede tachycardia earlier today with HR 

in 140-150s. No new complain otherwise. Feeling better and tolerating diet.





- Objective


Vital Signs & Weight: 


 Vital Signs (12 hours)











  Temp Pulse Resp BP BP Pulse Ox


 


 11/17/19 11:05  97.8 F  102 H  18   132/86  96


 


 11/17/19 07:21  98.5 F  93  18   128/78  98


 


 11/17/19 03:21  97.8 F  105 H  18  115/74   98








 Weight











Weight                         163 lb 6.4 oz














I&O: 


 











 11/16/19 11/17/19 11/18/19





 06:59 06:59 06:59


 


Intake Total  4400 


 


Output Total  3100 


 


Balance  1300 











Result Diagrams: 


 11/16/19 03:06





 11/17/19 04:37


Additional Labs: 


 Accuchecks











  11/17/19 11/16/19 11/16/19





  10:48 20:36 16:28


 


POC Glucose  265 H  178 H  347 H














Hospitalist ROS





- Medication


Medications: 


Active Medications











Generic Name Dose Route Start Last Admin





  Trade Name Freq  PRN Reason Stop Dose Admin


 


Acetaminophen  650 mg  11/16/19 08:52  11/17/19 08:51





  Tylenol  PO   650 mg





  Q4H PRN   Administration





  Headache/Fever/Mild Pain (1-3)   





     





     





     


 


Enoxaparin Sodium  40 mg  11/16/19 09:00  11/17/19 08:51





  Lovenox  SC   40 mg





  0900 JEANA   Administration





     





     





     





     


 


Lactated Ringer's  1,000 mls @ 200 mls/hr  11/17/19 08:45  11/17/19 08:50





  Lactated Ringer's  IV   1,000 mls





  .Q5H JEANA   Administration





     





     





     





     


 


Insulin Glargine 10 units/  0.1 mls @ 0 mls/hr  11/17/19 12:00  11/17/19 12:42





  Miscellaneous Medication  SC  11/17/19 14:00  0.1 mls





  NOW JEANA   Administration





     





     





     





     


 


Insulin Human Lispro  0 units  11/16/19 08:52  11/17/19 12:43





  Humalog  SC   6 unit





  .MODERATE SLIDING SC PRN   Administration





  Moderate Correctional Scale   





     





     





     


 


Insulin Human Lispro  2 units  11/16/19 12:00  11/17/19 12:42





  Humalog  SC   2 unit





  TID-WM JEANA   Administration





     





     





     





     


 


Sodium Chloride  10 ml  11/16/19 09:00  11/17/19 08:51





  Flush - Normal Saline  IVF   Not Given





  Q12HR JEANA   





     





     





     





     


 


Sodium Chloride  0 ml  11/17/19 02:29  11/17/19 03:14





  Ocean Nasal Spray 0.65%  EA NARE   1 spr





  PRN PRN   Administration





  Nasal Stuffiness   





     





     





     














- Exam


General Appearance: awake alert


Eye: anicteric sclera


ENT: normocephalic atraumatic


Neck: supple, symmetric, no JVD


Heart: RRR, no murmur


Respiratory: no wheezes, no rales, no ronchi, normal chest expansion


Gastrointestinal: soft, non-tender, non-distended, normal bowel sounds


Extremities: no cyanosis, no edema


Neurological: cranial nerve grossly intact, no focal deficits


Psychiatric: normal affect, A&O x 3





Hosp A/P


(1) DKA, type 1


Code(s): E10.10 - TYPE 1 DIABETES MELLITUS WITH KETOACIDOSIS WITHOUT COMA   

Status: Acute   


Qualifiers: 


 





(2) Volume depletion


Code(s): E86.9 - VOLUME DEPLETION, UNSPECIFIED   Status: Acute   





(3) Nausea and vomiting


Code(s): R11.2 - NAUSEA WITH VOMITING, UNSPECIFIED   Status: Acute   





(4) Tachyarrhythmia


Code(s): R00.0 - TACHYCARDIA, UNSPECIFIED   Status: Acute   





(5) Diabetes mellitus type 1


Status: Chronic   





(6) Hypomagnesemia


Code(s): E83.42 - HYPOMAGNESEMIA   Status: Acute   





- Plan


Increase IVF to 200 cc/hr.


increase lantus to 30 units daily. Continue humalog


Replete serum magnesium


Telemetry.


Diet as tolerated.


antiemetic as needed

## 2019-11-19 NOTE — DIS
DATE OF ADMISSION:  11/16/2019



DATE OF DISCHARGE:  11/18/2019



PRIMARY CARE PROVIDER:  [facility] maryjane Jiménez [facility].



DISCHARGE DIAGNOSES:  

1. Diabetic ketoacidosis.

2. Uncontrolled type 1 diabetes mellitus.

3. Volume depletion.

4. Nausea and vomiting.

5. Tachyarrhythmia.

6. Hypomagnesemia.

7. Metabolic acidosis.

8. Acute kidney injury.

9. Hypertension.



HOSPITAL COURSE:  A 29-year-old female with known history of type 1 diabetes,

admitted with acute onset of nausea and vomiting.  The patient was found to have

tachycardia as well as hyperglycemia with blood glucose in 300, associated with

metabolic acidosis, ketonuria.  Impression of early diabetic ketoacidosis was made

as the patient admitted to noncompliance with insulin therapy.  She was started on

IV fluid therapy as well as subcutaneous insulin with improvement.  Tachycardia

persisted and was worse with activity.  This was felt to be due to volume depletion

and IV fluid therapy was escalated.  There was a concern for SVT, hence the patient

was started on low-dose Toprol-XL with improvement.  She remained stable and was

subsequently discharged.  Of note, creatinine on presentation was 0.89 and did go

back to 0.59 at discharge, suggestive of acute kidney injury. 



PHYSICAL EXAMINATION:

VITAL SIGNS:  Temperature 98.2, pulse 106, respiratory rate 16, SpO2 of 99% on room

air, blood pressure is 138/99. 

GENERAL:  Young female in no distress.  Afebrile.  Anicteric.  Acyanotic. 

HEENT:  Normocephalic, atraumatic.  Oral mucosa is moist. 

CARDIOVASCULAR:  Regular rhythm and rate, but tachycardic.  Heart sounds 1 and 2

were normal. 

RESPIRATORY:  Good air entry bilaterally with no crackle or rhonchi or use of

accessory muscles. 

GI:  Full, soft, nontender, nondistended with normal bowel sounds. 

EXTREMITIES:  Grossly normal looking atraumatic with no edema or erythema. 

CNS:  Conscious, alert, oriented x3 with appropriate mental status.  Cranial nerves

2 through 12 are grossly intact. 



The patient is ambulant.



DISCHARGE CONDITION:  Improved.



DISCHARGE DISPOSITION:  Home.



DISCHARGE MEDICATIONS:  

1. Wellbutrin 300 mg p.o. daily.

2. Prozac 20 mg p.o. daily.

3. Risperdal 2 mg p.o. daily at bedtime.

4. Humalog sliding scale 0 to 15 units subcutaneously.

5. Humalog 4 units subcutaneously with meals.

6. Lantus 40 units subcutaneously daily.

7. Metoprolol succinate 25 mg p.o. daily.



FOLLOWUP INSTRUCTIONS:  

1. The patient was advised to check blood sugar 3 times a day and keep a log to be

shown to the PCP on followup. 

2. The patient is to follow with PCP in 7 days. 

This discharge took more than 33 minutes.







Job ID:  [id number]

## 2019-12-16 NOTE — PDOC.HOSPP
- Subjective


Encounter Date: 12/16/19


Encounter Time: 10:25


Subjective: 





Ms. Gonzalez was seen today in follow-up of DKA. She says her headache has 

improved. She denies any nausea or vomiting or abdominal pain.





- Objective


Vital Signs & Weight: 


 Vital Signs (12 hours)











  Temp Pulse Ox


 


 12/16/19 07:00  98.3 F 


 


 12/16/19 03:00  98.5 F  100


 


 12/16/19 02:55  98.5 F 








 Weight











Weight                         149 lb 7.574 oz











 Most Recent Monitor Data











Heart Rate from ECG            124


 


NIBP                           113/71


 


NIBP BP-Mean                   85


 


Respiration from ECG           23


 


SpO2                           100














I&O: 


 











 12/15/19 12/16/19 12/17/19





 06:59 06:59 06:59


 


Intake Total  1765.3 1131


 


Output Total  725 500


 


Balance  1040.3 631











Result Diagrams: 


 12/15/19 23:19





 12/16/19 05:53


Additional Labs: 


 Accuchecks











  12/16/19 12/16/19 12/16/19





  09:55 08:44 07:44


 


POC Glucose  167 H  101  79














  12/16/19 12/16/19 12/16/19





  06:02 04:55 03:47


 


POC Glucose  125 H  137 H  185 H














  12/16/19 12/16/19





  02:20 00:36


 


POC Glucose  259 H  359 H














Hospitalist ROS





- Medication


Medications: 


Active Medications











Generic Name Dose Route Start Last Admin





  Trade Name Freq  PRN Reason Stop Dose Admin


 


Bupropion HCl  300 mg  12/16/19 09:00  12/16/19 08:52





  Wellbutrin Xl  PO   300 mg





  QAM JEANA   Administration





     





     





     





     


 


Enoxaparin Sodium  30 mg  12/16/19 09:00  12/16/19 08:52





  Lovenox  SC   30 mg





  0900 JEANA   Administration





     





     





     





     


 


Famotidine  20 mg  12/16/19 09:00  12/16/19 08:52





  Pepcid  PO   20 mg





  BID JEANA   Administration





     





     





     





     


 


Fluoxetine HCl  20 mg  12/16/19 09:00  12/16/19 08:52





  Prozac  PO   20 mg





  DAILY JEANA   Administration





     





     





     





     


 


Potassium Chloride/Dextrose/Sod Cl  1,000 mls @ 250 mls/hr  12/16/19 02:01  12/ 16/19 08:45





  D5 1/2 Ns W/20 Meq Kcl  IV   1,000 mls





  .Q4H PRN   Administration





  Step 4 of DKA Protocol   





     





  Protocol   





     


 


Potassium Chloride/Sodium Chloride  1,000 mls @ 500 mls/hr  12/16/19 02:01  12/ 16/19 05:10





  Ns 0.9% W/ 20 Meq Kcl  IV   1,000 mls





  .Q2H PRN   Administration





  Step 2 of DKA Protocol   





     





  Protocol   





     


 


Insulin Glargine 25 units/  0.25 mls @ 0 mls/hr  12/16/19 09:00  12/16/19 08:59





  Miscellaneous Medication  SC   0.2 mls





  BID JEANA   Administration





     





     





     





     


 


Potassium Phosphate 30 mmol/  510 mls @ 83.3 mls/hr  12/16/19 07:30  12/16/19 07

:52





  Sodium Chloride  IVPB  12/16/19 13:38  510 mls





  NOW JEANA   Administration





     





     





     





     


 


Sodium Chloride  10 ml  12/16/19 09:00  12/16/19 08:53





  Flush - Normal Saline  IVF   10 ml





  Q12HR JEANA   Administration





     





     





     





     














- Exam


Eye: PERRL


Heart: RRR, no murmur, no gallops, no rubs, normal peripheral pulses


Respiratory: CTAB, no wheezes, no rales, no ronchi, normal chest expansion, no 

tachypnea, normal percussion


Gastrointestinal: soft, non-tender, non-distended, normal bowel sounds, no 

palpable masses, no hepatomegaly


Extremities: no cyanosis, 1+ LE edema





Hosp A/P


(1) Abnormal blood electrolyte level


Code(s): E87.8 - OTH DISORDERS OF ELECTROLYTE AND FLUID BALANCE, NEC   Status: 

Acute   





(2) Anxiety and depression


Code(s): F41.8 - OTHER SPECIFIED ANXIETY DISORDERS   Status: Chronic   





- Plan





* DKA- 1- her AG has closed


* She has been taken off the insulin drip


* It is unclear what her regimen is at home- will need to discuss with her 

mother


* She can be moved out the ICU if she continues to be stable

## 2019-12-16 NOTE — HP
PRESENTING COMPLAINT:  Headache.



HISTORY OF PRESENT ILLNESS:  Ms. Carlos Pradhan is a 29-year-old  female

with history of type 1 diabetes mellitus, on insulin; depression, previous history

of substance abuse, who presented because of feeling of headache.  Headache is more

of throbbing, mostly in the frontal area.  Onset is since the last one day.  The

patient on presentation was noted with elevated glucose of over 300 as well as anion

gap metabolic acidosis.  She has been admitted for DKA.  She denies any nausea or

vomiting.  She denies any diarrhea, fever, or chills.  She admits to some

intermittent dysuria, but denies any flank pain or polyuria.  She states she takes

Lantus 40 units regularly.  She states her last dose was yesterday.  She also states

she takes insulin 6 units with meals.  She states she will occasionally uses an

extra sliding scale.  She feels she still has headache which she still rated at 8/10

now. 



PAST MEDICAL HISTORY:  Diabetes mellitus type 1; history of recurrent DKA, last

episode was one month ago; history of depression, history of prior history of

substance abuse. 



PAST SURGICAL HISTORY:  None.



FAMILY HISTORY:  Significant for hypertension in the mother.



SOCIAL HISTORY:  The patient resides with family.  Previous history of marijuana and

tobacco use, but denied current use now.  Denies any alcohol intake or illicit drug

use. 



HOME MEDICATIONS:  Reviewed include:

1. Bupropion.

2. Fluoxetine.

3. Lantus insulin.

4. Risperidone.

5. Humalog.



ALLERGIES:  NO KNOWN DRUG ALLERGY.



PHYSICAL EXAMINATION:

VITAL SIGNS:  Currently, blood pressure of 112/65, pulse of 122, respiratory rate of

22, O2 saturation 99% on 2 L nasal cannula. 

HEENT:  Head is atraumatic, normocephalic.  Markedly dry oral mucosa. 

NECK:  No JVD.  No carotid bruit. 

RESPIRATORY:  Good air entry.  No crepitation. 

CARDIOVASCULAR:  S1 and S2, tachycardic but no murmur. 

GI:  Abdomen is full, soft, nontender.  No organomegaly.  No suprapubic fullness.

No CVA tenderness. 

EXTREMITIES:  No calf tenderness.  No pedal edema. 

NEUROLOGICAL:  The patient is alert, conversant.  No neurological focal motor

deficit. 



LABORATORY DATA:  WBC 16.9, hemoglobin 15, and platelet 311.  Neutrophils 83%, but

no bands.  PH of 7.12, pCO2 of 26, pO2 of 59, creatinine of 1.3, bicarb of 8, anion

gap of 24; potassium 4.4, repeat of 4.6; sodium 133.  AST and alkaline phosphatase

normal.  Beta hydroxybutyrate 6.5. 



IMAGING STUDIES:  Chest x-ray shows no acute intrathoracic processes.



IMPRESSION:  

1. Recurrent diabetic ketoacidosis.

2. Persistent headache.

3. History of depression.

4. Possible urinary tract infection.



PLAN:  We will manage the patient for the following.

1. DKA.  We will admit to the ICU.  We will start empirical antibiotics with

Levaquin.  We will start insulin with insulin drip protocol.  Continue glucose q.1

hour.  Follow anion gap with BMP q.4.  Adjust insulin per protocol.  We will

continue to follow.  We will obtain phosphorus level and replete if low.  The

patient might need DM education as well as Endocrine referral on discharge. 

2. Possible UTI.  Follow urinalysis.  We will start empirical Levaquin.

3. History of depression.  Continue home regimen.

4. DVT prophylaxis, subcutaneous Lovenox.

5. Advanced directives.  The patient is full code.



TIME SPENT:  Total time spent in review of record, discussion with patient, and

evaluation greater than 60 minutes. 







Job ID:  [id number]

## 2019-12-17 NOTE — PDOC.EVN
Event Note





- Event Note


Event Note: 





I spoke with the patient's mother over the phone. She tells me that her 

daughter is responsible for giving herself iinsulin, and that she should know 

how much and when she takes it. I have asked her to come to the hospital 

tomorrow afternoon, so we can clarify the situation together ( with the patient)

.

## 2019-12-17 NOTE — PDOC.HOSPP
- Subjective


Encounter Date: 12/17/19


Encounter Time: 15:48


Subjective: 





Ms. Gonzalez was seen today in follow-up of DKA > She does not have any new 

complaints. 





- Objective


Vital Signs & Weight: 


 Vital Signs (12 hours)











  Temp Pulse Resp BP Pulse Ox


 


 12/17/19 07:45  97.6 F  111 H  16  115/78  96








 Weight











Weight                         149 lb 7.574 oz











 Most Recent Monitor Data











Heart Rate from ECG            117


 


NIBP                           128/84


 


NIBP BP-Mean                   98


 


Respiration from ECG           22


 


SpO2                           100














I&O: 


 











 12/16/19 12/17/19 12/18/19





 06:59 06:59 06:59


 


Intake Total 1765.3 3531 


 


Output Total 725 1277 


 


Balance 1040.3 2254 











Result Diagrams: 


 12/16/19 12:17





 12/17/19 05:21


Additional Labs: 


 Accuchecks











  12/17/19 12/17/19 12/16/19





  11:29 05:17 20:53


 


POC Glucose  276 H  223 H  323 H














Hospitalist ROS





- Medication


Medications: 


Active Medications











Generic Name Dose Route Start Last Admin





  Trade Name Freq  PRN Reason Stop Dose Admin


 


Bupropion HCl  300 mg  12/16/19 09:00  12/17/19 09:05





  Wellbutrin Xl  PO   300 mg





  QAM JEANA   Administration





     





     





     





     


 


Enoxaparin Sodium  30 mg  12/16/19 09:00  12/17/19 08:59





  Lovenox  SC   30 mg





  0900 JEANA   Administration





     





     





     





     


 


Famotidine  20 mg  12/16/19 09:00  12/17/19 08:59





  Pepcid  PO   20 mg





  BID JEANA   Administration





     





     





     





     


 


Fluoxetine HCl  20 mg  12/16/19 09:00  12/17/19 08:58





  Prozac  PO   20 mg





  DAILY JEANA   Administration





     





     





     





     


 


Insulin Glargine 25 units/  0.25 mls @ 0 mls/hr  12/16/19 09:00  12/17/19 08:59





  Miscellaneous Medication  SC   0.25 mls





  BID JEANA   Administration





     





     





     





     


 


Dextrose/Sodium Chloride  1,000 mls @ 125 mls/hr  12/16/19 10:45  12/17/19 15:45





  D5 1/2 Ns  IV   1,000 mls





  .Q8H JEANA   Administration





     





     





     





     


 


Insulin Human Lispro  0 units  12/16/19 13:33  12/17/19 12:12





  Humalog  SC   6 unit





  .MODERATE SLIDING SC PRN   Administration





  Moderate Correctional Scale   





     





     





     


 


Insulin Human Lispro  0 units  12/16/19 13:33  12/16/19 21:30





  Humalog  SC   4 units/m2





  .BEDTIME SLIDING SC PRN   Administration





  Bedtime Correctional Scale   





     





     





     


 


Levofloxacin  750 mg  12/17/19 06:00  12/17/19 05:08





  Levaquin  PO   750 mg





  0600 JEANA   Administration





     





     





     





     


 


Risperidone  2 mg  12/16/19 21:00  12/16/19 21:41





  Risperidone  PO   2 mg





  HS JEANA   Administration





     





     





     





     


 


Sodium Chloride  10 ml  12/16/19 09:00  12/17/19 09:00





  Flush - Normal Saline  IVF   Not Given





  Q12HR JEANA   





     





     





     





     














- Exam


Eye: PERRL


Heart: RRR, no murmur, no gallops, no rubs, normal peripheral pulses


Respiratory: CTAB, no wheezes, no rales, no ronchi, normal chest expansion


Gastrointestinal: soft, non-tender, non-distended, normal bowel sounds, no 

palpable masses, no hepatomegaly





Hosp A/P


(1) Abnormal blood electrolyte level


Code(s): E87.8 - OTH DISORDERS OF ELECTROLYTE AND FLUID BALANCE, NEC   Status: 

Acute   





(2) Anxiety and depression


Code(s): F41.8 - OTHER SPECIFIED ANXIETY DISORDERS   Status: Chronic   





- Plan





* DKA- 1- resolved


* I am still unclear if she is taking the Lantus insulin. she is not able to 

tell me how many units, or whem she normally takes it.


* Will try to discuss with her mother


* Will see if she will qualify for home health to aid in her management at home.

## 2019-12-18 NOTE — PDOC.HOSPP
- Subjective


Encounter Date: 12/18/19


Encounter Time: 17:11


Subjective: 





Ms. Gonzalez was seen today in follow-up of DKA. She does not have any new 

complaints.





- Objective


Vital Signs & Weight: 


 Vital Signs (12 hours)











  Temp Pulse Resp BP Pulse Ox


 


 12/18/19 08:00  98.3 F  102 H  18  133/80  98








 Weight











Weight                         149 lb 7.574 oz











 Most Recent Monitor Data











Heart Rate from ECG            117


 


NIBP                           128/84


 


NIBP BP-Mean                   98


 


Respiration from ECG           22


 


SpO2                           100














I&O: 


 











 12/17/19 12/18/19 12/19/19





 06:59 06:59 06:59


 


Intake Total 3531  


 


Output Total 1277  


 


Balance 2254  











Result Diagrams: 


 12/16/19 12:17





 12/18/19 08:43


Additional Labs: 


 Accuchecks











  12/18/19 12/18/19 12/18/19





  16:38 15:18 11:29


 


POC Glucose  142 H  184 H  212 H














  12/18/19 12/17/19 12/17/19





  04:50 23:59 19:40


 


POC Glucose  249 H  175 H  288 H














Hospitalist ROS





- Medication


Medications: 


Active Medications











Generic Name Dose Route Start Last Admin





  Trade Name Freq  PRN Reason Stop Dose Admin


 


Bupropion HCl  300 mg  12/16/19 09:00  12/18/19 10:15





  Wellbutrin Xl  PO   300 mg





  QAM JEANA   Administration





     





     





     





     


 


Enoxaparin Sodium  30 mg  12/16/19 09:00  12/18/19 08:36





  Lovenox  SC   30 mg





  0900 JEANA   Administration





     





     





     





     


 


Famotidine  20 mg  12/16/19 09:00  12/18/19 08:36





  Pepcid  PO   20 mg





  BID JEANA   Administration





     





     





     





     


 


Fluoxetine HCl  20 mg  12/16/19 09:00  12/18/19 08:36





  Prozac  PO   20 mg





  DAILY JEANA   Administration





     





     





     





     


 


Insulin Glargine 25 units/  0.25 mls @ 0 mls/hr  12/16/19 09:00  12/18/19 08:37





  Miscellaneous Medication  SC   0.25 mls





  BID JEANA   Administration





     





     





     





     


 


Dextrose/Sodium Chloride  1,000 mls @ 125 mls/hr  12/16/19 10:45  12/18/19 08:36





  D5 1/2 Ns  IV   1,000 mls





  .Q8H JEANA   Administration





     





     





     





     


 


Insulin Human Lispro  0 units  12/16/19 13:33  12/18/19 13:16





  Humalog  SC   4 unit





  .MODERATE SLIDING SC PRN   Administration





  Moderate Correctional Scale   





     





     





     


 


Insulin Human Lispro  0 units  12/16/19 13:33  12/17/19 19:53





  Humalog  SC   3 units/m2





  .BEDTIME SLIDING SC PRN   Administration





  Bedtime Correctional Scale   





     





     





     


 


Levofloxacin  750 mg  12/17/19 06:00  12/18/19 05:27





  Levaquin  PO   750 mg





  0600 JEANA   Administration





     





     





     





     


 


Risperidone  2 mg  12/16/19 21:00  12/17/19 19:51





  Risperidone  PO   2 mg





  HS JEANA   Administration





     





     





     





     


 


Sodium Chloride  10 ml  12/16/19 09:00  12/18/19 08:38





  Flush - Normal Saline  IVF   Not Given





  Q12HR JEANA   





     





     





     





     














- Exam


Eye: PERRL


Heart: RRR, no murmur, no gallops, no rubs, normal peripheral pulses


Respiratory: CTAB, no wheezes, no rales, no ronchi, normal chest expansion, no 

tachypnea


Gastrointestinal: soft, non-tender, non-distended, normal bowel sounds, no 

palpable masses, no hepatomegaly, no splenomegaly


Extremities: no cyanosis, no edema





Hosp A/P


(1) Abnormal blood electrolyte level


Code(s): E87.8 - OTH DISORDERS OF ELECTROLYTE AND FLUID BALANCE, NEC   Status: 

Acute   





(2) Anxiety and depression


Code(s): F41.8 - OTHER SPECIFIED ANXIETY DISORDERS   Status: Chronic   





- Plan





* DKA- 1- resolved


* Her mother and sister were at the bedside this evening. After some discussion 

back and forth , the patient finally offered the information that she knows 

when to take the Basaglar insulin, and that she takes it in the morning. She 

admits that she only took 16 units rather than the prescribed 40 units, because 

the PEN ran out. She had planed to take the remainder, but she got distracted 

by a headache, and didn;t go to the refrigerator to get the other PEN. Then she 

started feeling sick and found herself in the hospital. We discussed the 

instructions for how to take her insulin both in English and in Canadian. She 

was able to tell me this back as well as her mother and sister. (The 

instructions she had from the clinic were in English)- will send her home with 

instructions in both English and Canadian ( so her mother can help her out).


* Will replace potassium, and can be discharged home later today

## 2019-12-19 NOTE — DIS
DATE OF ADMISSION:  12/16/2019



DATE OF DISCHARGE:  12/18/2019



PRIMARY CARE PHYSICIAN:  Dr. Alonso.



DISCHARGE DISPOSITION:  Home.



DISCHARGE DIAGNOSES:  

1. Diabetic ketoacidosis.

2. Diabetes mellitus type 1.

3. Depression.

4. Previous history of substance abuse.



DISCHARGE MEDICATIONS:  The patient is being discharged on,

1. Basaglar insulin/glargine insulin 40 units subcu daily.

2. NovoLog 6 units t.i.d. with meals.

3. Risperdal 2 mg at bedtime.

4. Wellbutrin  mg daily.

5. Metoprolol 25 mg XL daily.



CODE STATUS:  Full code.



ALLERGIES:  NO KNOWN DRUG ALLERGIES.



HOSPITAL COURSE:  Ms. Gonzalez is a pleasant 29-year-old female, who was admitted to

the hospital after she was found to be in DKA.  The full details of which are

outlined in the history and physical.  At first, the patient did not know how she

came to have an elevated blood sugar.  She was very vague about when and how much

she was supposed to take her long-acting insulin, the Basaglar, it was not until I

asked her mother to come to the hospital so that we could try to clear this up.

When she finally admitted __________ several questionings that she had taken only 16

units of the Basaglar the day of admission, she says that is when __________ had run

out.  She was supposed to take the remainder to make up to a total of 40 units, but

she got distracted when she started having a headache and then she started feeling

sick and then she basically came to the hospital.  She has always been very clear as

to how much of her short-acting insulin she is supposed to take with each meal, so I

suspect this is at least in part what may have happened that caused her to go into

DKA.  Her mother brought in the information from the clinic with the instructions

for her insulin, but her mother admits that English is her second language and I do

not believe she was able to read the instructions very well.  We went over this in

detail both in English and in Wallisian, and on this occasion, we will have her

information packet printed in both languages.  This way her mother can help enforce

the regimen at home.  The patient was able to speak back her instructions, her

mother as well, and therefore, we will be discharging her home.  I have asked her

mother to move up her psychiatric appointment.  She was able to clarify that she has

been seeing a psychiatrist.  She last saw her psychiatrist about a month ago, and

she had an appointment next month, and she will be making an earlier appointment.

As well, we discussed that she needs to see her primary care physician in a couple

of days and also to follow up with her endocrinologist, she actually has an

endocrinologist who helps with her diabetes as well.  After her potassium is

replaced today, she will be discharged home. 







Job ID:  [id number]

## 2019-12-19 NOTE — PQF
[employer] Winform ViewerJH AGUILAR TONI MD

[id number]                                                             [facility]
[id number]

[id number]                             

                                   

CLINICAL DOCUMENTATION CLARIFICATION FORM:  POST DISCHARGE



Addendum to original discharge summary date:  __________________________________
____



Late entry note date:  _________________________________________________________
__











DATE:  12/19/2019                                                  ATTN:MANAN JIMENEZ MD





Please exercise your independent, professional judgment in responding to the 
clarification form. 

Clinical indicators are provided on the bottom of this form for your review





Please check appropriate box(s) to clarify if the following diagnosis has been 
ruled in or ruled out:

___UTI____(CDI/Coding list diagnosis here)



[  ] Ruled in diagnosis

     [  ] Continue to treat        [  ] Resolved

[  ] Ruled out diagnosis

[  ] Cannot rule out diagnosis

[  ] Other diagnosis ___________

[  ] Unable to determine



In addition, please specify:

Present on Admission (POA):  [  ] Yes             [  ] No             [  ] 
Unable to determine



For continuity of documentation, please document condition throughout progress 
notes and discharge summary.  Thank You.



CLINICAL INDICATORS - SIGNS / SYMPTOMS / LABS

Possible Urinary Tract infection - Documented in H&p on 12/16 by Melany smith MD

Elevated WBC 16.9 on 12/15 and 13.7 on 12/16 - Documented in Laboratory

Abnormal Blood electrolyte level - Documented in Hospital PNs on 12/16 by  
MANAN JIMENEZ MD



RISK FACTORS

DKA

DM type 1



TREATMENTS

Follow urinalysis and we will start empirical Levaquin - Documented in H&p on 12
/16 by Melany smith MD

Levofloxacin 750 mg  - Medication report



[employer](This form is maintained as 
a part of the permanent medical record)

2015 [facility].  All Rights Reserved

[email or web address]    [not 
provided]

                                                              



 











MTDD

## 2020-06-05 NOTE — RAD
RIGHT KNEE FOUR VIEW:

6/5/20

 

HISTORY: 

Fall. Trauma. 

 

COMPARISON: 

None.

 

FINDINGS: 

No fracture. No malalignment. Soft tissues are unremarkable. 

 

IMPRESSION: 

No acute osseous abnormality. Mild pretibial soft tissue swelling. 

 

POS: HOME

## 2020-06-05 NOTE — RAD
RADIOGRAPH RIGHT HAND 3VIEWS:



DATE:

6/5/2020



HISTORY:

29-year-old female status post acute traumatic injury to right hand



FINDINGS:

There is no evidence of fracture or dislocation. There is no evidence of periostitis, permeative lesi
on, osteolytic lesion, or osteoblastic lesion. The joint spaces are maintained without erosions or

significant osteophytes.



IMPRESSION:

Normal



Reported By: Chico Mendes 

Electronically Signed:  6/5/2020 1:26 PM

## 2020-06-05 NOTE — RAD
LEFT KNEE FOUR VIEW:

6/5/20

 

HISTORY: 

Fall. Trauma. 

 

COMPARISON: 

None.

 

FINDINGS: 

No acute fracture or malalignment. No significant joint effusion. 

 

IMPRESSION: 

No acute osseous abnormality. 

 

POS: HOME
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
## 2020-06-05 NOTE — CT
CT face noncontrast



HISTORY: Fall. Injury.



FINDINGS: The mandible, globes, and zygomatic arches are intact. No fracture or dislocation evident. 
No orbital hematoma.



Near complete homogeneous opacification of the left maxillary sinus. Configuration suggests a large m
ucus retention cyst without aggressive qualities. Smaller polyp or mucous retention cyst at the

anterior wall of the right maxillary sinus.



  



IMPRESSION :

No acute injury demonstrated.



Large left and small right maxillary sinus mucus retention cysts.



Reported By: CORNELIUS Brown 

Electronically Signed:  6/5/2020 1:28 PM